# Patient Record
Sex: FEMALE | Race: OTHER | NOT HISPANIC OR LATINO | URBAN - METROPOLITAN AREA
[De-identification: names, ages, dates, MRNs, and addresses within clinical notes are randomized per-mention and may not be internally consistent; named-entity substitution may affect disease eponyms.]

---

## 2024-07-07 ENCOUNTER — INPATIENT (INPATIENT)
Facility: HOSPITAL | Age: 44
LOS: 0 days | Discharge: LEFT BEFORE DISCHARGE | End: 2024-07-08
Attending: INTERNAL MEDICINE | Admitting: INTERNAL MEDICINE
Payer: SELF-PAY

## 2024-07-07 VITALS
WEIGHT: 240.08 LBS | DIASTOLIC BLOOD PRESSURE: 66 MMHG | SYSTOLIC BLOOD PRESSURE: 103 MMHG | RESPIRATION RATE: 16 BRPM | OXYGEN SATURATION: 98 % | HEART RATE: 98 BPM | TEMPERATURE: 99 F | HEIGHT: 63 IN

## 2024-07-07 DIAGNOSIS — Z98.890 OTHER SPECIFIED POSTPROCEDURAL STATES: Chronic | ICD-10-CM

## 2024-07-07 LAB
A1C WITH ESTIMATED AVERAGE GLUCOSE RESULT: 10.2 % — HIGH (ref 4–5.6)
ADD ON TEST-SPECIMEN IN LAB: SIGNIFICANT CHANGE UP
ALBUMIN SERPL ELPH-MCNC: 2.5 G/DL — LOW (ref 3.4–5)
ALBUMIN SERPL ELPH-MCNC: 3 G/DL — LOW (ref 3.3–5)
ALP SERPL-CCNC: 56 U/L — SIGNIFICANT CHANGE UP (ref 40–120)
ALP SERPL-CCNC: 70 U/L — SIGNIFICANT CHANGE UP (ref 40–120)
ALT FLD-CCNC: 20 U/L — SIGNIFICANT CHANGE UP (ref 12–42)
ALT FLD-CCNC: 74 U/L — HIGH (ref 10–45)
ANION GAP SERPL CALC-SCNC: 11 MMOL/L — SIGNIFICANT CHANGE UP (ref 9–16)
ANION GAP SERPL CALC-SCNC: 8 MMOL/L — SIGNIFICANT CHANGE UP (ref 5–17)
APPEARANCE UR: ABNORMAL
APTT BLD: 26.6 SEC — SIGNIFICANT CHANGE UP (ref 24.5–35.6)
AST SERPL-CCNC: 118 U/L — HIGH (ref 10–40)
AST SERPL-CCNC: 23 U/L — SIGNIFICANT CHANGE UP (ref 15–37)
BASE EXCESS BLDA CALC-SCNC: -4.5 MMOL/L — LOW (ref -2–3)
BASOPHILS # BLD AUTO: 0.01 K/UL — SIGNIFICANT CHANGE UP (ref 0–0.2)
BASOPHILS # BLD AUTO: 0.02 K/UL — SIGNIFICANT CHANGE UP (ref 0–0.2)
BASOPHILS NFR BLD AUTO: 0.1 % — SIGNIFICANT CHANGE UP (ref 0–2)
BASOPHILS NFR BLD AUTO: 0.1 % — SIGNIFICANT CHANGE UP (ref 0–2)
BILIRUB SERPL-MCNC: 0.5 MG/DL — SIGNIFICANT CHANGE UP (ref 0.2–1.2)
BILIRUB SERPL-MCNC: 0.5 MG/DL — SIGNIFICANT CHANGE UP (ref 0.2–1.2)
BILIRUB UR-MCNC: NEGATIVE — SIGNIFICANT CHANGE UP
BUN SERPL-MCNC: 10 MG/DL — SIGNIFICANT CHANGE UP (ref 7–23)
BUN SERPL-MCNC: 12 MG/DL — SIGNIFICANT CHANGE UP (ref 7–23)
CALCIUM SERPL-MCNC: 7.8 MG/DL — LOW (ref 8.4–10.5)
CALCIUM SERPL-MCNC: 8.5 MG/DL — SIGNIFICANT CHANGE UP (ref 8.5–10.5)
CHLORIDE SERPL-SCNC: 103 MMOL/L — SIGNIFICANT CHANGE UP (ref 96–108)
CHLORIDE SERPL-SCNC: 98 MMOL/L — SIGNIFICANT CHANGE UP (ref 96–108)
CK SERPL-CCNC: 637 U/L — HIGH (ref 25–170)
CO2 BLDA-SCNC: 21 MMOL/L — SIGNIFICANT CHANGE UP (ref 19–24)
CO2 SERPL-SCNC: 22 MMOL/L — SIGNIFICANT CHANGE UP (ref 22–31)
CO2 SERPL-SCNC: 24 MMOL/L — SIGNIFICANT CHANGE UP (ref 22–31)
COLOR SPEC: YELLOW — SIGNIFICANT CHANGE UP
CREAT SERPL-MCNC: 1.23 MG/DL — SIGNIFICANT CHANGE UP (ref 0.5–1.3)
CREAT SERPL-MCNC: 1.32 MG/DL — HIGH (ref 0.5–1.3)
CRP SERPL-MCNC: 106.1 MG/L — HIGH (ref 0.5–3)
DIFF PNL FLD: NEGATIVE — SIGNIFICANT CHANGE UP
EGFR: 51 ML/MIN/1.73M2 — LOW
EGFR: 56 ML/MIN/1.73M2 — LOW
EOSINOPHIL # BLD AUTO: 0 K/UL — SIGNIFICANT CHANGE UP (ref 0–0.5)
EOSINOPHIL # BLD AUTO: 0 K/UL — SIGNIFICANT CHANGE UP (ref 0–0.5)
EOSINOPHIL NFR BLD AUTO: 0 % — SIGNIFICANT CHANGE UP (ref 0–6)
EOSINOPHIL NFR BLD AUTO: 0 % — SIGNIFICANT CHANGE UP (ref 0–6)
ESTIMATED AVERAGE GLUCOSE: 246 MG/DL — HIGH (ref 68–114)
FLUAV AG NPH QL: SIGNIFICANT CHANGE UP
FLUBV AG NPH QL: SIGNIFICANT CHANGE UP
GLUCOSE BLDC GLUCOMTR-MCNC: 158 MG/DL — HIGH (ref 70–99)
GLUCOSE BLDC GLUCOMTR-MCNC: 207 MG/DL — HIGH (ref 70–99)
GLUCOSE BLDC GLUCOMTR-MCNC: 217 MG/DL — HIGH (ref 70–99)
GLUCOSE SERPL-MCNC: 235 MG/DL — HIGH (ref 70–99)
GLUCOSE SERPL-MCNC: 297 MG/DL — HIGH (ref 70–99)
GLUCOSE UR QL: NEGATIVE MG/DL — SIGNIFICANT CHANGE UP
HCO3 BLDA-SCNC: 20 MMOL/L — LOW (ref 21–28)
HCT VFR BLD CALC: 27.5 % — LOW (ref 34.5–45)
HCT VFR BLD CALC: 28.1 % — LOW (ref 34.5–45)
HGB BLD-MCNC: 8.7 G/DL — LOW (ref 11.5–15.5)
HGB BLD-MCNC: 9 G/DL — LOW (ref 11.5–15.5)
HOROWITZ INDEX BLDA+IHG-RTO: 21 — SIGNIFICANT CHANGE UP
IMM GRANULOCYTES NFR BLD AUTO: 0.5 % — SIGNIFICANT CHANGE UP (ref 0–0.9)
IMM GRANULOCYTES NFR BLD AUTO: 0.5 % — SIGNIFICANT CHANGE UP (ref 0–0.9)
INR BLD: 1.23 — HIGH (ref 0.85–1.18)
KETONES UR-MCNC: NEGATIVE MG/DL — SIGNIFICANT CHANGE UP
LACTATE BLDV-MCNC: 1.1 MMOL/L — SIGNIFICANT CHANGE UP (ref 0.5–2)
LACTATE SERPL-SCNC: 1.2 MMOL/L — SIGNIFICANT CHANGE UP (ref 0.5–2)
LEUKOCYTE ESTERASE UR-ACNC: NEGATIVE — SIGNIFICANT CHANGE UP
LYMPHOCYTES # BLD AUTO: 1.79 K/UL — SIGNIFICANT CHANGE UP (ref 1–3.3)
LYMPHOCYTES # BLD AUTO: 13.5 % — SIGNIFICANT CHANGE UP (ref 13–44)
LYMPHOCYTES # BLD AUTO: 15.4 % — SIGNIFICANT CHANGE UP (ref 13–44)
LYMPHOCYTES # BLD AUTO: 2.05 K/UL — SIGNIFICANT CHANGE UP (ref 1–3.3)
MAGNESIUM SERPL-MCNC: 1.7 MG/DL — SIGNIFICANT CHANGE UP (ref 1.6–2.6)
MCHC RBC-ENTMCNC: 25.1 PG — LOW (ref 27–34)
MCHC RBC-ENTMCNC: 25.4 PG — LOW (ref 27–34)
MCHC RBC-ENTMCNC: 31.6 GM/DL — LOW (ref 32–36)
MCHC RBC-ENTMCNC: 32 GM/DL — SIGNIFICANT CHANGE UP (ref 32–36)
MCV RBC AUTO: 79.3 FL — LOW (ref 80–100)
MCV RBC AUTO: 79.4 FL — LOW (ref 80–100)
MONOCYTES # BLD AUTO: 0.9 K/UL — SIGNIFICANT CHANGE UP (ref 0–0.9)
MONOCYTES # BLD AUTO: 1.26 K/UL — HIGH (ref 0–0.9)
MONOCYTES NFR BLD AUTO: 7.7 % — SIGNIFICANT CHANGE UP (ref 2–14)
MONOCYTES NFR BLD AUTO: 8.3 % — SIGNIFICANT CHANGE UP (ref 2–14)
NEUTROPHILS # BLD AUTO: 11.79 K/UL — HIGH (ref 1.8–7.4)
NEUTROPHILS # BLD AUTO: 8.87 K/UL — HIGH (ref 1.8–7.4)
NEUTROPHILS NFR BLD AUTO: 76.3 % — SIGNIFICANT CHANGE UP (ref 43–77)
NEUTROPHILS NFR BLD AUTO: 77.6 % — HIGH (ref 43–77)
NITRITE UR-MCNC: NEGATIVE — SIGNIFICANT CHANGE UP
NRBC # BLD: 0 /100 WBCS — SIGNIFICANT CHANGE UP (ref 0–0)
NRBC # BLD: 0 /100 WBCS — SIGNIFICANT CHANGE UP (ref 0–0)
PCO2 BLDA: 36 MMHG — SIGNIFICANT CHANGE UP (ref 32–45)
PCO2 BLDV: 40 MMHG — SIGNIFICANT CHANGE UP (ref 39–42)
PCP SPEC-MCNC: SIGNIFICANT CHANGE UP
PH BLDA: 7.36 — SIGNIFICANT CHANGE UP (ref 7.35–7.45)
PH BLDV: 7.42 — SIGNIFICANT CHANGE UP (ref 7.32–7.43)
PH UR: 8 — SIGNIFICANT CHANGE UP (ref 5–8)
PHOSPHATE SERPL-MCNC: 3.2 MG/DL — SIGNIFICANT CHANGE UP (ref 2.5–4.5)
PLATELET # BLD AUTO: 233 K/UL — SIGNIFICANT CHANGE UP (ref 150–400)
PLATELET # BLD AUTO: 255 K/UL — SIGNIFICANT CHANGE UP (ref 150–400)
PO2 BLDA: 91 MMHG — SIGNIFICANT CHANGE UP (ref 83–108)
PO2 BLDV: <35 MMHG — SIGNIFICANT CHANGE UP (ref 25–45)
POTASSIUM SERPL-MCNC: 3.3 MMOL/L — LOW (ref 3.5–5.3)
POTASSIUM SERPL-MCNC: 3.7 MMOL/L — SIGNIFICANT CHANGE UP (ref 3.5–5.3)
POTASSIUM SERPL-SCNC: 3.3 MMOL/L — LOW (ref 3.5–5.3)
POTASSIUM SERPL-SCNC: 3.7 MMOL/L — SIGNIFICANT CHANGE UP (ref 3.5–5.3)
PROT SERPL-MCNC: 7 G/DL — SIGNIFICANT CHANGE UP (ref 6–8.3)
PROT SERPL-MCNC: 7.7 G/DL — SIGNIFICANT CHANGE UP (ref 6.4–8.2)
PROT UR-MCNC: NEGATIVE MG/DL — SIGNIFICANT CHANGE UP
PROTHROM AB SERPL-ACNC: 13.9 SEC — HIGH (ref 9.5–13)
RBC # BLD: 3.47 M/UL — LOW (ref 3.8–5.2)
RBC # BLD: 3.54 M/UL — LOW (ref 3.8–5.2)
RBC # FLD: 16.8 % — HIGH (ref 10.3–14.5)
RBC # FLD: 17.1 % — HIGH (ref 10.3–14.5)
RSV RNA NPH QL NAA+NON-PROBE: SIGNIFICANT CHANGE UP
SAO2 % BLDA: 98.6 % — HIGH (ref 94–98)
SAO2 % BLDV: 52.3 % — LOW (ref 67–88)
SARS-COV-2 RNA SPEC QL NAA+PROBE: SIGNIFICANT CHANGE UP
SODIUM SERPL-SCNC: 133 MMOL/L — LOW (ref 135–145)
SODIUM SERPL-SCNC: 133 MMOL/L — SIGNIFICANT CHANGE UP (ref 132–145)
SP GR SPEC: 1.01 — SIGNIFICANT CHANGE UP (ref 1–1.03)
UROBILINOGEN FLD QL: 1 MG/DL — SIGNIFICANT CHANGE UP (ref 0.2–1)
WBC # BLD: 11.63 K/UL — HIGH (ref 3.8–10.5)
WBC # BLD: 15.2 K/UL — HIGH (ref 3.8–10.5)
WBC # FLD AUTO: 11.63 K/UL — HIGH (ref 3.8–10.5)
WBC # FLD AUTO: 15.2 K/UL — HIGH (ref 3.8–10.5)

## 2024-07-07 PROCEDURE — 36000 PLACE NEEDLE IN VEIN: CPT

## 2024-07-07 PROCEDURE — 70450 CT HEAD/BRAIN W/O DYE: CPT | Mod: 26

## 2024-07-07 PROCEDURE — 73702 CT LWR EXTREMITY W/O&W/DYE: CPT | Mod: 26,50

## 2024-07-07 PROCEDURE — 71045 X-RAY EXAM CHEST 1 VIEW: CPT | Mod: 26

## 2024-07-07 PROCEDURE — 73590 X-RAY EXAM OF LOWER LEG: CPT | Mod: 26,50

## 2024-07-07 PROCEDURE — 76937 US GUIDE VASCULAR ACCESS: CPT | Mod: 26

## 2024-07-07 PROCEDURE — 99285 EMERGENCY DEPT VISIT HI MDM: CPT | Mod: 25

## 2024-07-07 PROCEDURE — 99291 CRITICAL CARE FIRST HOUR: CPT | Mod: GC

## 2024-07-07 PROCEDURE — 36556 INSERT NON-TUNNEL CV CATH: CPT | Mod: LT

## 2024-07-07 PROCEDURE — 99053 MED SERV 10PM-8AM 24 HR FAC: CPT

## 2024-07-07 RX ORDER — CEFEPIME 1 G/1
2000 INJECTION, POWDER, FOR SOLUTION INTRAMUSCULAR; INTRAVENOUS EVERY 8 HOURS
Refills: 0 | Status: DISCONTINUED | OUTPATIENT
Start: 2024-07-07 | End: 2024-07-07

## 2024-07-07 RX ORDER — CLINDAMYCIN PHOSPHATE 150 MG/ML
600 INJECTION, SOLUTION INTRAVENOUS EVERY 8 HOURS
Refills: 0 | Status: DISCONTINUED | OUTPATIENT
Start: 2024-07-07 | End: 2024-07-07

## 2024-07-07 RX ORDER — DEXTROSE 30 % IN WATER 30 %
25 VIAL (ML) INTRAVENOUS ONCE
Refills: 0 | Status: DISCONTINUED | OUTPATIENT
Start: 2024-07-07 | End: 2024-07-08

## 2024-07-07 RX ORDER — VANCOMYCIN HYDROCHLORIDE 50 MG/ML
1500 KIT ORAL ONCE
Refills: 0 | Status: COMPLETED | OUTPATIENT
Start: 2024-07-07 | End: 2024-07-07

## 2024-07-07 RX ORDER — PIPERACILLIN SODIUM AND TAZOBACTAM SODIUM 3; .375 G/15ML; G/15ML
4.5 INJECTION, POWDER, LYOPHILIZED, FOR SOLUTION INTRAVENOUS EVERY 8 HOURS
Refills: 0 | Status: DISCONTINUED | OUTPATIENT
Start: 2024-07-07 | End: 2024-07-08

## 2024-07-07 RX ORDER — VANCOMYCIN HYDROCHLORIDE 50 MG/ML
1750 KIT ORAL EVERY 12 HOURS
Refills: 0 | Status: DISCONTINUED | OUTPATIENT
Start: 2024-07-07 | End: 2024-07-07

## 2024-07-07 RX ORDER — DEXTROSE MONOHYDRATE 100 MG/ML
125 INJECTION, SOLUTION INTRAVENOUS ONCE
Refills: 0 | Status: DISCONTINUED | OUTPATIENT
Start: 2024-07-07 | End: 2024-07-08

## 2024-07-07 RX ORDER — PIPERACILLIN SODIUM AND TAZOBACTAM SODIUM 3; .375 G/15ML; G/15ML
3.38 INJECTION, POWDER, LYOPHILIZED, FOR SOLUTION INTRAVENOUS ONCE
Refills: 0 | Status: COMPLETED | OUTPATIENT
Start: 2024-07-07 | End: 2024-07-07

## 2024-07-07 RX ORDER — SODIUM CHLORIDE 0.9 % (FLUSH) 0.9 %
1000 SYRINGE (ML) INJECTION ONCE
Refills: 0 | Status: COMPLETED | OUTPATIENT
Start: 2024-07-07 | End: 2024-07-07

## 2024-07-07 RX ORDER — NALOXONE HYDROCHLORIDE 1 MG/ML
0.2 INJECTION PARENTERAL
Qty: 2 | Refills: 0 | Status: DISCONTINUED | OUTPATIENT
Start: 2024-07-07 | End: 2024-07-07

## 2024-07-07 RX ORDER — POTASSIUM CHLORIDE 600 MG/1
40 TABLET, FILM COATED, EXTENDED RELEASE ORAL ONCE
Refills: 0 | Status: COMPLETED | OUTPATIENT
Start: 2024-07-07 | End: 2024-07-07

## 2024-07-07 RX ORDER — NALOXONE HYDROCHLORIDE 1 MG/ML
0.2 INJECTION PARENTERAL ONCE
Refills: 0 | Status: COMPLETED | OUTPATIENT
Start: 2024-07-07 | End: 2024-07-07

## 2024-07-07 RX ORDER — ACETAMINOPHEN 325 MG
650 TABLET ORAL EVERY 6 HOURS
Refills: 0 | Status: DISCONTINUED | OUTPATIENT
Start: 2024-07-07 | End: 2024-07-08

## 2024-07-07 RX ORDER — INSULIN LISPRO 100 [IU]/ML
INJECTION, SOLUTION SUBCUTANEOUS
Refills: 0 | Status: DISCONTINUED | OUTPATIENT
Start: 2024-07-07 | End: 2024-07-08

## 2024-07-07 RX ORDER — GLUCAGON HYDROCHLORIDE 1 MG/ML
1 INJECTION, POWDER, FOR SOLUTION INTRAMUSCULAR; INTRAVENOUS; SUBCUTANEOUS ONCE
Refills: 0 | Status: DISCONTINUED | OUTPATIENT
Start: 2024-07-07 | End: 2024-07-08

## 2024-07-07 RX ORDER — DEXTROSE MONOHYDRATE AND SODIUM CHLORIDE 5; .3 G/100ML; G/100ML
1000 INJECTION, SOLUTION INTRAVENOUS
Refills: 0 | Status: DISCONTINUED | OUTPATIENT
Start: 2024-07-07 | End: 2024-07-08

## 2024-07-07 RX ORDER — KETOROLAC TROMETHAMINE 30 MG/ML
15 INJECTION, SOLUTION INTRAMUSCULAR ONCE
Refills: 0 | Status: DISCONTINUED | OUTPATIENT
Start: 2024-07-07 | End: 2024-07-07

## 2024-07-07 RX ORDER — DEXTROSE 30 % IN WATER 30 %
12.5 VIAL (ML) INTRAVENOUS ONCE
Refills: 0 | Status: DISCONTINUED | OUTPATIENT
Start: 2024-07-07 | End: 2024-07-08

## 2024-07-07 RX ORDER — NOREPINEPHRINE BITARTRATE 1 MG/ML
0.02 INJECTION INTRAVENOUS
Qty: 8 | Refills: 0 | Status: DISCONTINUED | OUTPATIENT
Start: 2024-07-07 | End: 2024-07-08

## 2024-07-07 RX ORDER — PIPERACILLIN SODIUM AND TAZOBACTAM SODIUM 3; .375 G/15ML; G/15ML
4.5 INJECTION, POWDER, LYOPHILIZED, FOR SOLUTION INTRAVENOUS ONCE
Refills: 0 | Status: COMPLETED | OUTPATIENT
Start: 2024-07-07 | End: 2024-07-07

## 2024-07-07 RX ORDER — HEPARIN SODIUM 50 [USP'U]/ML
7500 INJECTION, SOLUTION INTRAVENOUS EVERY 8 HOURS
Refills: 0 | Status: DISCONTINUED | OUTPATIENT
Start: 2024-07-07 | End: 2024-07-08

## 2024-07-07 RX ORDER — ACETAMINOPHEN 325 MG
975 TABLET ORAL ONCE
Refills: 0 | Status: COMPLETED | OUTPATIENT
Start: 2024-07-07 | End: 2024-07-07

## 2024-07-07 RX ORDER — DEXTROSE 30 % IN WATER 30 %
15 VIAL (ML) INTRAVENOUS ONCE
Refills: 0 | Status: DISCONTINUED | OUTPATIENT
Start: 2024-07-07 | End: 2024-07-08

## 2024-07-07 RX ORDER — NOREPINEPHRINE BITARTRATE 1 MG/ML
0.1 INJECTION INTRAVENOUS
Qty: 8 | Refills: 0 | Status: DISCONTINUED | OUTPATIENT
Start: 2024-07-07 | End: 2024-07-07

## 2024-07-07 RX ORDER — CLINDAMYCIN PHOSPHATE 150 MG/ML
900 INJECTION, SOLUTION INTRAVENOUS EVERY 8 HOURS
Refills: 0 | Status: DISCONTINUED | OUTPATIENT
Start: 2024-07-07 | End: 2024-07-08

## 2024-07-07 RX ORDER — VANCOMYCIN HYDROCHLORIDE 50 MG/ML
1750 KIT ORAL EVERY 12 HOURS
Refills: 0 | Status: DISCONTINUED | OUTPATIENT
Start: 2024-07-07 | End: 2024-07-08

## 2024-07-07 RX ORDER — NALOXONE HYDROCHLORIDE 1 MG/ML
0.4 INJECTION PARENTERAL ONCE
Refills: 0 | Status: COMPLETED | OUTPATIENT
Start: 2024-07-07 | End: 2024-07-07

## 2024-07-07 RX ORDER — HALOPERIDOL DECANOATE 100 MG/ML
2.5 VIAL (ML) INTRAMUSCULAR ONCE
Refills: 0 | Status: COMPLETED | OUTPATIENT
Start: 2024-07-07 | End: 2024-07-07

## 2024-07-07 RX ADMIN — PIPERACILLIN SODIUM AND TAZOBACTAM SODIUM 25 GRAM(S): 3; .375 INJECTION, POWDER, LYOPHILIZED, FOR SOLUTION INTRAVENOUS at 22:51

## 2024-07-07 RX ADMIN — KETOROLAC TROMETHAMINE 15 MILLIGRAM(S): 30 INJECTION, SOLUTION INTRAMUSCULAR at 01:39

## 2024-07-07 RX ADMIN — VANCOMYCIN HYDROCHLORIDE 250 MILLIGRAM(S): KIT at 21:38

## 2024-07-07 RX ADMIN — Medication 975 MILLIGRAM(S): at 01:39

## 2024-07-07 RX ADMIN — Medication 1000 MILLILITER(S): at 04:18

## 2024-07-07 RX ADMIN — Medication 2.5 MILLIGRAM(S): at 12:10

## 2024-07-07 RX ADMIN — Medication 1000 MILLILITER(S): at 01:39

## 2024-07-07 RX ADMIN — Medication 1000 MILLILITER(S): at 04:49

## 2024-07-07 RX ADMIN — Medication 975 MILLIGRAM(S): at 04:10

## 2024-07-07 RX ADMIN — POTASSIUM CHLORIDE 40 MILLIEQUIVALENT(S): 600 TABLET, FILM COATED, EXTENDED RELEASE ORAL at 02:52

## 2024-07-07 RX ADMIN — NOREPINEPHRINE BITARTRATE 20.4 MICROGRAM(S)/KG/MIN: 1 INJECTION INTRAVENOUS at 09:00

## 2024-07-07 RX ADMIN — NOREPINEPHRINE BITARTRATE 20.4 MICROGRAM(S)/KG/MIN: 1 INJECTION INTRAVENOUS at 04:43

## 2024-07-07 RX ADMIN — KETOROLAC TROMETHAMINE 15 MILLIGRAM(S): 30 INJECTION, SOLUTION INTRAMUSCULAR at 04:10

## 2024-07-07 RX ADMIN — NALOXONE HYDROCHLORIDE 0.4 MILLIGRAM(S): 1 INJECTION PARENTERAL at 08:59

## 2024-07-07 RX ADMIN — CLINDAMYCIN PHOSPHATE 112 MILLIGRAM(S): 150 INJECTION, SOLUTION INTRAVENOUS at 21:20

## 2024-07-07 RX ADMIN — Medication 650 MILLIGRAM(S): at 23:15

## 2024-07-07 RX ADMIN — INSULIN LISPRO 4: 100 INJECTION, SOLUTION SUBCUTANEOUS at 12:52

## 2024-07-07 RX ADMIN — Medication 1000 MILLILITER(S): at 04:17

## 2024-07-07 RX ADMIN — Medication 1000 MILLILITER(S): at 04:10

## 2024-07-07 RX ADMIN — CLINDAMYCIN PHOSPHATE 112 MILLIGRAM(S): 150 INJECTION, SOLUTION INTRAVENOUS at 13:00

## 2024-07-07 RX ADMIN — PIPERACILLIN SODIUM AND TAZOBACTAM SODIUM 25 GRAM(S): 3; .375 INJECTION, POWDER, LYOPHILIZED, FOR SOLUTION INTRAVENOUS at 14:51

## 2024-07-07 RX ADMIN — VANCOMYCIN HYDROCHLORIDE 1500 MILLIGRAM(S): KIT at 04:10

## 2024-07-07 RX ADMIN — NOREPINEPHRINE BITARTRATE 4.08 MICROGRAM(S)/KG/MIN: 1 INJECTION INTRAVENOUS at 19:41

## 2024-07-07 RX ADMIN — PIPERACILLIN SODIUM AND TAZOBACTAM SODIUM 200 GRAM(S): 3; .375 INJECTION, POWDER, LYOPHILIZED, FOR SOLUTION INTRAVENOUS at 01:39

## 2024-07-07 RX ADMIN — Medication 1000 MILLILITER(S): at 05:10

## 2024-07-07 RX ADMIN — NALOXONE HYDROCHLORIDE 50 MG/HR: 1 INJECTION PARENTERAL at 10:26

## 2024-07-07 RX ADMIN — PIPERACILLIN SODIUM AND TAZOBACTAM SODIUM 3.38 GRAM(S): 3; .375 INJECTION, POWDER, LYOPHILIZED, FOR SOLUTION INTRAVENOUS at 04:10

## 2024-07-07 RX ADMIN — INSULIN LISPRO 4: 100 INJECTION, SOLUTION SUBCUTANEOUS at 17:15

## 2024-07-07 RX ADMIN — PIPERACILLIN SODIUM AND TAZOBACTAM SODIUM 200 GRAM(S): 3; .375 INJECTION, POWDER, LYOPHILIZED, FOR SOLUTION INTRAVENOUS at 10:26

## 2024-07-07 RX ADMIN — NALOXONE HYDROCHLORIDE 0.2 MILLIGRAM(S): 1 INJECTION PARENTERAL at 08:24

## 2024-07-07 RX ADMIN — NALOXONE HYDROCHLORIDE 0.2 MILLIGRAM(S): 1 INJECTION PARENTERAL at 08:20

## 2024-07-07 RX ADMIN — HEPARIN SODIUM 7500 UNIT(S): 50 INJECTION, SOLUTION INTRAVENOUS at 12:52

## 2024-07-07 RX ADMIN — VANCOMYCIN HYDROCHLORIDE 250 MILLIGRAM(S): KIT at 02:52

## 2024-07-07 NOTE — CONSULT NOTE ADULT - ASSESSMENT
43F with PMHx of NIDDM, PE, TBI in the past with seizure d/o, anemia, HLD, not on any meds x few months, undomiciled, poor historian, BIBA for worsening b/l feet pain and increased tingling sensation x 1d. Pt reports having chronic BLE wounds x months    -Patient seen with Chief resident on call.  -Gently cleaned with peroxide and vashe solution.  -Dress with wet to dry gauze with vashe and wrap with kerlix.  Patient insisted on doing dressing change herself.  -No acute vascular intervention required.  -Vascular to continue to follow.  [phone] for any questions.

## 2024-07-07 NOTE — ED ADULT TRIAGE NOTE - CHIEF COMPLAINT QUOTE
Pt brought in by EMS for complaint of bilateral feet numbness and bilateral hand pain X 2 days stating she is a diabetic.

## 2024-07-07 NOTE — ED PROVIDER NOTE - PHYSICAL EXAMINATION
Gen - Disheveled F, BMI>40, NAD, speaking in full sentences   Skin - warm, dry, intact  HEENT - AT/NC, PERRL, mild conjunctival injection, pupils 3mm b/l, TM intact with no hemotympanium b/l, no facial contusion or periorbital ecchymosis, o/p clear, uvula midline, airway patent, neck supple with no step off or midline tenderness, FROM   CV - S1S2, R/R/R  Resp - respiration non-labored, CTAB, symmetric bs b/l, no r/r/w  GI - NABS, soft, protuberant, NT, no rebound or guarding, no CVAT b/l  MS - moving all extremities, no c/c/e, w/w/p, palpable symmetric pulses b/l, compartment soft, calves supple and NT   Neuro - AxOx3, no focal neuro deficits, ambulatory with steady gait Gen - Disheveled F, BMI>40, somnolent appearing but arousable to verbal stimuli, speaking in full sentences   Skin - warm, poor hygiene, soiled dressing to BLE, +6x5cm and 7x5cm irregularly demarcated ulcerated wounds to BLE on the medial aspects with chronic venous stasis skin changes, no crepitus, or active bleeding or bony/tendon exposure noted   HEENT - AT/NC, PERRL, mild conjunctival injection, pupils 3mm b/l, TM intact with no hemotympanium b/l, no facial contusion or periorbital ecchymosis, o/p clear, uvula midline, airway patent, neck supple with no step off or midline tenderness, FROM   CV - S1S2, R/R/R  Resp - respiration non-labored, CTAB, symmetric bs b/l, no r/r/w  GI - NABS, soft, protuberant, NT, no rebound or guarding, no CVAT b/l  MS - poor hygiene, chronic PVD skin changes with wounds per skin section, 1+ BLE pitting edema, calves supple and NT, palpable symmetric distal pulses b/l, compartment soft  Neuro - somnolent but arousable to verbal stimuli, no focal neuro deficits, unable to ambulate due to generalized weakness, able to weight bear on BLE

## 2024-07-07 NOTE — H&P ADULT - NSHPPHYSICALEXAM_GEN_ALL_CORE
VITAL SIGNS:  T(C): 36.8 (07-07-24 @ 14:08), Max: 39.6 (07-07-24 @ 01:52)  T(F): 98.2 (07-07-24 @ 14:08), Max: 103.2 (07-07-24 @ 01:52)  HR: 89 (07-07-24 @ 16:00) (52 - 98)  BP: 91/67 (07-07-24 @ 16:00) (67/37 - 149/87)  BP(mean): 74 (07-07-24 @ 16:00) (47 - 112)  RR: 20 (07-07-24 @ 16:00) (15 - 21)  SpO2: 98% (07-07-24 @ 16:00) (94% - 100%)  Wt(kg): --    PHYSICAL EXAM:  Constitutional: Patient is obtunded on arrival from [facility], respirating around 10 breaths per minute, intermittent snoring.   HEENT: Pinpoint pupils, anicteric sclera, mucous membranes moist.   Respiratory: Clear to auscultation bilaterally; no Wheezing/Crackles/Ronchi.  Cardiac: Regular rate and rhythm, S1/S2; no Murmur/Rub/Gallop;   Gastrointestinal: abdomen soft, non-tender and non-distended;   Extremities: Warm and Well perfused, no clubbing or cyanosis; bilateral lasrge ulcers to the anterior shin with overlying granulation tissue, mild purulent drainage.   Vascular: 2+ radial, Dorsalis pedis and posterior tibial pulses bilaterally.  Neurologic: Obtunded, awakes to voice after narcan with improved respirations.   Psychiatric: Unable to assess.    General - Disheveled F, BMI>40, somnolent appearing but arousable to verbal stimuli, speaking in full sentences   HEENT - AT/NC, PERRLA, mild conjunctival injection, pinpoint pupils  uvula midline, airway patent, neck supple  CV - S1S2, R/R/R  Pulmonary- , CTAB, symmetric bs b/l, no r/r/w  GI - soft, non tender, non distended, active bowel sounds   Extremities - 1+ BLE pitting edema, calves supple and NT, palpable symmetric distal pulses b/l  Skin - warm, poor hygiene, soiled dressing to BLE, +6x5cm and 7x5cm irregularly demarcated ulcerated wounds to BLE on the medial aspects with chronic venous stasis skin changes, no active bleeding   Neuro - somnolent but arousable to verbal stimuli, no focal neuro deficits,

## 2024-07-07 NOTE — H&P ADULT - ASSESSMENT
ASSESSMENT  43F with PMHx of NIDDM, PE, TBI in the past with seizure d/o, SSC with anemia, HLD, not on any meds x few months, undomiciled, poor historian, presented to [facility] with severe b/l LE open wounds requring amdission for cellulitis, and then transferred to [facility] MICU for septic shock from cellulitis requiring pressor support and currently being treated with Zosyn.     PLAN    Levo running on right AC.   BCx f/u  Replaced NG tube.      ASSESSMENT  43F with PMHx of NIDDM, PE, TBI in the past with seizure d/o, SSC with anemia, HLD, not on any meds x few months, undomiciled, poor historian, presented to [facility] with severe b/l LE open wounds requiring admission for cellulitis, and then transferred to [facility] MICU for septic shock from cellulitis requiring pressor support and currently being treated with Zosyn.     PLAN      NEURO   #Metabolic Encephalopathy  AMS 2/2 septic shock iso cellulitis. Pending further testing to assess severity.   Mechanical fall before arriving to hospital, with possible LoC & trauma to head.   -Zosyn for broad coverage, pending further testing results  -F/u BCx, consider CT LE to r/o necrotizing fascitis  -Obtain [facility]      CARDS  #Hypotension  Secondary to septic shock iso b/l LE cellulitis. MAPs >60 since on Levo.   -Levo ggt, titrate to stable MAPs  -Dopplers b/l LE    #HLD  -Obtain med rec for home meds      PULM  -ANEUDY    GI   #Deconditioned  Secondary to malnutritoin & poor PO intake  NG tube placed.   -Obtain nutrition consult, given possible refeeding concern as well   -Obtain confirmatory CXR for NGT    RENAL     ID  #Septic Shock #Cellulitis  #Metabolic Encephalopathy  AMS 2/2 septic shock iso cellulitis from b/l LE open wounds as source.   Started broad coverage with Zosyn. Pending further testing to assess severity, coverage.    -Zosyn & Vancomycin for cellulitis coverage, pending further testing results  -Get ID consult, obtain collateral, med rec  -F/u BCx, consider CT LE to r/o necrotizing fascitis  -Pain & fever control    ENDO  #NIDDM  Unable to provide history. No records on HIE checks.   -Obtain A1c  -FS  -Obtain collateral regarding disease course, home medications, med rec.     HEMEONC  #Hx of SCC  -Obtain collateral, med rec for course     RHEUM  #Fall  -Place fall precautions    #Ulcers #Open Wounds  -Pressure balancing mattress  -Serial wound checks      Px  F: Hydrate per fluid status needs  E: Replete as needed  N: Diet per nutrition assessment    FULL CODE       ASSESSMENT  43F with PMHx of NIDDM, PE, TBI in the past with seizure d/o, SSC with anemia, HLD, not on any meds x few months, undomiciled, poor historian, presented to [facility] with severe b/l LE open wounds requiring admission for cellulitis, and then transferred to [facility] MICU for septic shock from cellulitis requiring pressor support and currently being treated with Vanc,  Zosyn and Clindamycin.    PLAN    NEURO   #Metabolic Encephalopathy  AMS 2/2 septic shock iso cellulitis. Pending further testing to assess severity.   Mechanical fall before arriving to hospital, with possible LoC & trauma to head.   -Vanc, Zosyn and Clindamycin for broad coverage, pending further testing results  -F/u BCx,   - CT head: No acute intracranial findings  - CT LE: cutaneous wounds along the medial aspects of the lower legs bilaterally and diffuse BL subcutaneous soft tissue infiltration, left greater than R, consistent with edema and/or cellulitis      CARDS  #Hypotension  Secondary to septic shock iso b/l LE cellulitis. MAPs >60 since on Levo.   -Levo ggt, titrate to stable MAPs  -Dopplers b/l LE    #HLD  -Obtain med rec for home meds      PULM  -ANEUDY    GI   #Deconditioned  Secondary to malnutrition & poor PO intake  NG tube placed.   -Obtain nutrition consult, given possible refeeding concern as well   -Obtain confirmatory CXR for NGT    RENAL   - stable  - ANEUDY    ID  #Septic Shock #Cellulitis  #Metabolic Encephalopathy  AMS 2/2 septic shock iso cellulitis from b/l LE open wounds as source.   Started broad coverage with Zosyn. Pending further testing to assess severity, coverage.    -Zosyn & Vancomycin for cellulitis coverage, pending further testing results  -Get ID consult, obtain collateral, med rec  -F/u BCx, consider CT LE to r/o necrotizing fascitis  -Pain & fever control    #Ulcers #Open Wounds  - BL shin with eschars with hypertrophic skin changes  -Pressure balancing mattress  -Serial wound checks  - Consult vascular       ENDO  #NIDDM  Unable to provide history. No records on HIE checks.   - Glucose 297 this morning (07/07)  - A1c 10.2  - Nonurgent endocrine consult for DM management  -FS  -Obtain collateral regarding disease course, home medications, med rec.     HEMEONC  #Hx of SCC  -Obtain collateral, med rec for course     RHEUM  #Fall  -Place fall precautions      Px  F: Hydrate per fluid status needs  E: Replete as needed  N: Diet per nutrition assessment    FULL CODE       ASSESSMENT  43F with PMHx of NIDDM, PE, TBI in the past with seizure d/o, SSC with anemia, HLD, not on any meds x few months, undomiciled, poor historian, presented to [facility] with severe b/l LE open wounds requiring admission for cellulitis, and then transferred to [facility] MICU for septic shock from cellulitis requiring pressor support and currently being treated with Vanc,  Zosyn and Clindamycin.    PLAN    NEURO   #Metabolic Encephalopathy  AMS 2/2 septic shock iso cellulitis. Pending further testing to assess severity.   Mechanical fall before arriving to hospital, with possible LoC & trauma to head.   -Vanc, Zosyn and Clindamycin for broad coverage, pending further testing results  -F/u BCx,   - CT head: No acute intracranial findings  - CT LE: cutaneous wounds along the medial aspects of the lower legs bilaterally and diffuse BL subcutaneous soft tissue infiltration, left greater than R, consistent with edema and/or cellulitis      CARDS  #Hypotension  Secondary to septic shock iso b/l LE cellulitis. MAPs >60 since on Levo.   -Levo ggt, titrate to stable MAPs  -Dopplers b/l LE    #HLD  -Obtain med rec for home meds      PULM  -ANEUDY    GI   #Deconditioned  Secondary to malnutrition & poor PO intake  NG tube placed.   -Obtain nutrition consult, given possible refeeding concern as well   -Obtain confirmatory CXR for NGT      ID  #Septic Shock #Cellulitis  #Metabolic Encephalopathy  AMS 2/2 septic shock iso cellulitis from b/l LE open wounds as source.   Started broad coverage with Zosyn. Pending further testing to assess severity, coverage.    -Zosyn & Vancomycin for cellulitis coverage, pending further testing results  -Get ID consult, obtain collateral, med rec  -F/u BCx, consider CT LE to r/o necrotizing fascitis  -Pain & fever control    #Ulcers #Open Wounds  - BL shin with eschars with hypertrophic skin changes  -Pressure balancing mattress  -Serial wound checks  - Consult vascular     ENDO  #NIDDM  Unable to provide history. No records on HIE checks.   - Glucose 297 this morning (07/07)  - A1c 10.2  - Nonurgent endocrine consult for DM management  -FS  -Obtain collateral regarding disease course, home medications, med rec.     HEMEONC  #Hx of SCC  -Obtain collateral, med rec for course     RHEUM  #Fall  -Place fall precautions    RENAL   - stable  - ANEUDY      Px  F: Hydrate per fluid status needs  E: Replete as needed  N: Diet per nutrition assessment  DVT ppx: Heparin    FULL CODE       ASSESSMENT  43F with PMHx of NIDDM, PE, TBI in the past with seizure d/o, SSC with anemia, HLD, not on any meds x few months, undomiciled, poor historian, presented to [facility] with severe b/l LE open wounds requiring admission for cellulitis, and then transferred to [facility] MICU for septic shock from cellulitis requiring pressor support and currently being treated with Vanc,  Zosyn and Clindamycin.    PLAN    NEURO   #Metabolic Encephalopathy  AMS 2/2 septic shock iso cellulitis. Pending further testing to assess severity.   Mechanical fall before arriving to hospital, with possible LoC & trauma to head.   -Vanc, Zosyn and Clindamycin for broad coverage, pending further testing results  -F/u BCx,   - CT head: No acute intracranial findings      CARDS  #Hypotension: Patient found to have fevers, tachycardia. Extremities warm throughout with improvment after 4 L fluid repletion but notably reqiured norepinephrine to maintain map > 60.   Secondary to septic shock iso b/l LE cellulitis. MAPs >60 since on Levo.   -Levo ggt, titrate to stable MAPs  -Dopplers b/l LE    #HLD  -Obtain med rec for home meds      PULM  -ANEUDY    GI   #Deconditioned  Secondary to malnutrition & poor PO intake  NG tube placed.   -Obtain nutrition consult, given possible refeeding concern as well   -Obtain confirmatory CXR for NGT      ID  #Septic Shock #Cellulitis  #Metabolic Encephalopathy  AMS 2/2 septic shock iso cellulitis from b/l LE open wounds as source.   Started broad coverage with Zosyn. Pending further testing to assess severity, coverage.    -Zosyn, Vancomycin, clindamycin for cellulitis coverage, pending further testing results  -F/u BCx, consider CT LE to r/o necrotizing fascitis.   -Pain & fever control    #Ulcers #Open Wounds  - BL shin with eschars with hypertrophic skin changes  -Pressure balancing mattress  -Serial wound checks  - Consult vascular     ENDO  #NIDDM  Unable to provide history. No records on HIE checks.   - Glucose 297 this morning (07/07)  - A1c 10.2  - Nonurgent endocrine consult for DM management  -FS  -Obtain collateral regarding disease course, home medications, med rec.     HEMEONC  #Hx of SCC  -Obtain collateral, med rec for course     RHEUM  #Fall  -Place fall precautions    RENAL   - stable  - ANEUDY      Px  F: Hydrate per fluid status needs  E: Replete as needed  N: Diet per nutrition assessment  DVT ppx: Heparin    FULL CODE

## 2024-07-07 NOTE — ED PROVIDER NOTE - NSICDXPASTSURGICALHX_GEN_ALL_CORE_FT
PAST SURGICAL HISTORY:  No significant past surgical history      PAST SURGICAL HISTORY:  History of exploratory thoracotomy

## 2024-07-07 NOTE — H&P ADULT - ATTENDING COMMENTS
Homeless woman with DM2, TBI hx, LE wounds with metabolic and possibly toxic encephalopathy with septic shock from cellulitis  physical as above  empiric vanco/clinda/zosyn pending cultures  vascular consult  some improvement in mental status with naloxone despite negative toxicology screen; to start infusion  follow ETCO2  NE to keep MAP over 65  aggressive insulin coverage and may need infusion

## 2024-07-07 NOTE — ED PROVIDER NOTE - PROGRESS NOTE DETAILS
pt with difficult peripheral IV access.  Unsuccessful attempts by multiple RNs at bedside.  20 g PIV placed by myself to the L hand region. Pt with 2 PIV access pt is hemodynamically guarded s/p 3L of IVF with soft BP, 96/50s, MAP 65, mentation unchanged, somnolent but arousable to verbal stimuli, fever defervesced Status post Toradol and Tylenol.  Source most likely secondary to infected bilateral leg wounds.  Not suspecting  abscess or necrotizing fasciitis based on exam and x-rays.  Case discussed with medical intensivist for medicine stepdown admission due to hemodynamics after aggressive ED resuscitation and IV antibiotics.  Case cleared by Dr. Delaney for F admission. Hospitalist paged and awaiting callback on reassessment, pt's HD are labile, BP downtrended again to 77/50s > 82/45, oliguric, will start pt on peripheral levophed for septic shock. Bolivar inserted for strict I&O. Case re-discussed with Dr. Delaney, accepted to MICU for further management initial BP after 4th liter of IVF noted to be around 100/50s, MAP of 65mmHG, mentation unchanged. case discussed with Dr. Tang, accepted to [facility]. dean with 1L of clear UOP, BP improved to 102/60 > 120/80 after starting on peripheral levo. will continue to titrate and monitoring I&Os, awaiting transfer to [facility] MICU

## 2024-07-07 NOTE — H&P ADULT - NSHPLABSRESULTS_GEN_ALL_CORE
General - Disheveled F, BMI>40, somnolent appearing but arousable to verbal stimuli, speaking in full sentences   HEENT - AT/NC, PERRLA, mild conjunctival injection, pinpoint pupils  uvula midline, airway patent, neck supple  CV - S1S2, R/R/R  Pulmonary- , CTAB, symmetric bs b/l, no r/r/w  GI - soft, non tender, non distended, active bowel sounds   Extremities - 1+ BLE pitting edema, calves supple and NT, palpable symmetric distal pulses b/l  Skin - warm, poor hygiene, soiled dressing to BLE, +6x5cm and 7x5cm irregularly demarcated ulcerated wounds to BLE on the medial aspects with chronic venous stasis skin changes, no active bleeding   Neuro - somnolent but arousable to verbal stimuli, no focal neuro deficits, .  LABS:                         8.7    11.63 )-----------( 233      ( 07 Jul 2024 08:05 )             27.5     07-07    133<L>  |  103  |  12  ----------------------------<  297<H>  3.7   |  22  |  1.23    Ca    7.8<L>      07 Jul 2024 08:05  Phos  3.2     07-07  Mg     1.7     07-07    TPro  7.0  /  Alb  3.0<L>  /  TBili  0.5  /  DBili  x   /  AST  118<H>  /  ALT  74<H>  /  AlkPhos  70  07-07    PT/INR - ( 07 Jul 2024 08:05 )   PT: 13.9 sec;   INR: 1.23          PTT - ( 07 Jul 2024 08:05 )  PTT:26.6 sec  Urinalysis Basic - ( 07 Jul 2024 08:05 )    Color: x / Appearance: x / SG: x / pH: x  Gluc: 297 mg/dL / Ketone: x  / Bili: x / Urobili: x   Blood: x / Protein: x / Nitrite: x   Leuk Esterase: x / RBC: x / WBC x   Sq Epi: x / Non Sq Epi: x / Bacteria: x      CARDIAC MARKERS ( 07 Jul 2024 08:05 )  x     / x     / 637 U/L / x     / x            Lactate, Blood: 1.2 mmol/L (07-07 @ 08:05)      RADIOLOGY, EKG & ADDITIONAL TESTS: Reviewed.

## 2024-07-07 NOTE — ED ADULT NURSE NOTE - NSICDXPASTMEDICALHX_GEN_ALL_CORE_FT
PAST MEDICAL HISTORY:  DM (diabetes mellitus)     HLD (hyperlipidemia)     Open leg wound     Pulmonary emboli

## 2024-07-07 NOTE — H&P ADULT - NSICDXPASTMEDICALHX_GEN_ALL_CORE_FT
PAST MEDICAL HISTORY:  Anemia     DM (diabetes mellitus)     HLD (hyperlipidemia)     Open leg wound     Pulmonary emboli     Seizure disorder     Sickle cell disease     TBI (traumatic brain injury)

## 2024-07-07 NOTE — ED PROVIDER NOTE - CLINICAL SUMMARY MEDICAL DECISION MAKING FREE TEXT BOX
44 yo F with PMHx of NIDDM, PE, TBI in the past with seizure d/o, SSC with anemia, HLD, not on any meds x few months, undomiciled, poor historian, BIBA for worsening b/l feet pain and increased tingling sensation x 1d.   - noted foul smelling wound and warm to touch on arrival. Pt is fully disrobed 42 yo F with PMHx of NIDDM, PE, TBI in the past with seizure d/o, SSC with anemia, HLD, not on any meds x few months, undomiciled, poor historian, BIBA for worsening b/l feet pain and increased tingling sensation x 1d.   - noted foul smelling wound and warm to touch on arrival. Pt is fully disrobed with no concerning bugs or rash noted   p 42 yo F with PMHx of NIDDM, PE, TBI in the past with seizure d/o, SSC with anemia, HLD, not on any meds x few months, undomiciled, poor historian, BIBA for worsening b/l feet pain and increased tingling sensation x 1d.   - noted foul smelling wound and warm to touch on arrival. Pt is fully disrobed with no concerning bugs or rash noted   plan - sepsis code activation, pan culture, including blood, urine, viral swabs, and cxr, labs, ekg, cardiac monitoring, start IVF 30cc/kg/hr boluses, empiric abx for wound/pseudomonas coverage, xray of b/l tib/fib, antipyretics, and reassess  DDx - sepsis, infected wound, nec fas, osteo, UTI, PNA, viral syndrome

## 2024-07-07 NOTE — PROCEDURE NOTE - NSINDICATIONS_GEN_A_CORE
narcan/other
antibiotic therapy/emergency venous access/fluid administration
antibiotic therapy/emergency venous access/fluid administration

## 2024-07-07 NOTE — ED PROVIDER NOTE - ATTENDING SHARED VISIT SELECTOR YES
CM updated by [facility], they are able to accept Pt when medically ready. PS to Dr. Selam Candelaria to update. Plan discharge tomorrow pending creatinine. Discharging RN- Pt accepted to [facility]. Please arrange transportation with Sondra Dubose [phone]. Call [facility]ainey/admissions with transport time [phone].   Call report to [phone], fax discharge summary/AVS to [phone] Yes

## 2024-07-07 NOTE — ED ADULT NURSE NOTE - SEPSIS REFERENCE DATA FOR CRITERIA 1 WBC
Breathing spontaneous and unlabored. Breath sounds clear and equal bilaterally with regular rhythm.
Breathing spontaneous and unlabored. Breath sounds clear and equal bilaterally with regular rhythm.
Abnormal WBC: < 4,000 OR > 12,000

## 2024-07-07 NOTE — ED PROVIDER NOTE - OBJECTIVE STATEMENT
44 yo F with PMHx of NIDDM, not on any meds currently, undomiciled, BIBA for b/l feet pain and increased tingling sensation x 1d. Denies polyuria, polydipsia, polyphagia, fever, chills, N/V/D/C, abdominal pain, CP, SOB, palpitations, focal weakness, HA, dizziness, tremors, change in urinary/bowel functions, rash, AH/VH, and malaise. 44 yo F with PMHx of NIDDM, PE, TBI in the past with seizure d/o, not on any meds currently, undomiciled, BIBA for b/l feet pain and increased tingling sensation x 1d. Denies polyuria, polydipsia, polyphagia, fever, chills, N/V/D/C, abdominal pain, CP, SOB, palpitations, focal weakness, HA, dizziness, tremors, change in urinary/bowel functions, rash, AH/VH, and malaise. 42 yo F with PMHx of NIDDM, PE, TBI in the past with seizure d/o, SSC with anemia, HLD, not on any meds x few months, undomiciled, poor historian, BIBA for worsening b/l feet pain and increased tingling sensation x 1d. Pt reports having chronic BLE wounds x months, last dressing change was approximately 2 wks ago at OSH. Noted worsening pain, tingling, numbness, and difficulty ambulating for the past day. States that she has been feeling progressively weaker and just laid herself down at [facility] [address]. Denies trauma, fall, polyuria, polydipsia, polyphagia, fever, chills, N/V/D/C, abdominal pain, CP, SOB, palpitations, focal weakness, HA, dizziness, tremors, change in urinary/bowel functions, rash, AH/VH, and malaise.

## 2024-07-07 NOTE — ED PROVIDER NOTE - NSICDXPASTMEDICALHX_GEN_ALL_CORE_FT
PAST MEDICAL HISTORY:  DM (diabetes mellitus)      PAST MEDICAL HISTORY:  DM (diabetes mellitus)     HLD (hyperlipidemia)     Open leg wound     Pulmonary emboli      PAST MEDICAL HISTORY:  Anemia     DM (diabetes mellitus)     HLD (hyperlipidemia)     Open leg wound     Pulmonary emboli     Seizure disorder     Sickle cell disease     TBI (traumatic brain injury)

## 2024-07-07 NOTE — ED ADULT NURSE REASSESSMENT NOTE - ANCILLARY STATUS
VSS. Pain controlled with scheduled oral pain medication. Pumping for infant in NICU. Fundus firm and midline with light lochia rubra. Tdap vaccine administered. Voiding spontaneously with adequate output. Passing gas. Discharge orders in per OB. Mother/baby care guide, discharge instructions, and s/s of when to contact provider reviewed with pt and handouts given, understanding verbalized. Pt to follow up at OBGYN office in 1 week. All questions/concerns answered and addressed. No concerns at this time.        awaiting transport to [facility]ICU

## 2024-07-07 NOTE — CONSULT NOTE ADULT - SUBJECTIVE AND OBJECTIVE BOX
Vascular Attending: Romario        HPI:  Ms. Arriaza is a 43F with PMHx of NIDDM, PE, TBI in the past with seizure d/o, anemia, HLD, not on any meds x few months, undomiciled, poor historian, BIBA for worsening b/l feet pain and increased tingling sensation x 1d. Pt reports having chronic BLE wounds x months, last dressing change was approximately 2 wks ago at OSH. Noted worsening pain, tingling, numbness, and difficulty ambulating for the past day. States that she has been feeling progressively weaker and just laid herself down at [facility]. Denies trauma, fall, polyuria, polydipsia, polyphagia, fever, chills, N/V/D/C, abdominal pain, CP, SOB, palpitations, focal weakness, HA, dizziness, tremors, change in urinary/bowel functions, rash, AH/VH, and malaise.     Pt presents to ED with c/o numbness to extremities. PMH NIDDM as per patient. Pt appears to be poor historian and is unable to provide full history or prescribed medications. Pt is complaining of numbness to upper and lower extremities and presents with wounds to anterior aspects of b/l tibia. Upon inspection, no signs of injury present to head or neck.     ED Vitals T98.1F, /66 mm Hg, HR 98 /min, RR 16 /min, 98% O2 Sat on RA  ED Workup  CBC: WBC 15.20, H/H 9.0/28.1, Plt 255   CMP: Na 133, K3.3, HCO3 98, Cl 24, BUN/Cr 10/1.32, Glucose 235. Ca 8.5, AG 11.    ABG:    LFTs albumin 2.5, .1.    UA pH 8.0, specific gravity 1.011, no additional +findings. Urine Microscopy +squamous epithelial cells, amorphous precipitates present.   UDS, RVP negative.    EKG: Sinus tachycardia to 101, with PVCs. Left axis deviation. QTc normal, 459. No acute ST-T wave changes.    CXR, Bilateral LE tibia /fibula Xray done @[facility]: read pending.      Vascular consulted for evaluation of LE wounds and cellulitis.  Patient reports she sees a vascular surgeon at OSH but does not remember name or location.  She reports it has been a few months since she has seen the surgeon.  Reports last dressing change to wounds was 1 week prior to admission.    REVIEW OF SYSTEMS:    · Review of Systems: per HPI          PAST MEDICAL HISTORY:   Anemia , DM (diabetes mellitus) , HLD (hyperlipidemia)    Open leg wound,  Pulmonary emboli , Seizure disorder,  Sickle cell disease, TBI (traumatic brain injury).      ALLERGIES AND HOME MEDICATIONS:    Home Medications: unknown.     PAST SURGICAL HISTORY: History of exploratory thoracotomy          (07 Jul 2024 07:21)      PAST MEDICAL & SURGICAL HISTORY:  DM (diabetes mellitus)      Pulmonary emboli      HLD (hyperlipidemia)      Open leg wound      Sickle cell disease      Anemia      TBI (traumatic brain injury)      Seizure disorder      History of exploratory thoracotomy          REVIEW OF SYSTEMS  Review of Systems  CONSTITUTIONAL:  Denies Fevers / chills, sweats, fatigue, weight loss, weight gain                                      NEURO:  +parethesias, Deniesseizures, syncope, confusion                                                                                EYES:  Denies Blurry vision, discharge, pain, loss of vision                                                                                    ENMT:  Denies Difficulty hearing, vertigo, dysphagia, epistaxis, recent dental work                                       CV:  Denies Chest pain, palpitations, VILLARREAL, orthopnea                                                                                          RESPIRATORY:  Denies Wheezing, SOB, cough / sputum, hemoptysis                                                                GI:  Denies Nausea, vomiting, diarrhea, constipation, melena, difficulty swallowing                                               : Denies Hematuria, dysuria, urgency, incontinence                                                                                         MUSCULOSKELETAL:  Denies arthritis, joint swelling, muscle weakness                                                             SKIN/BREAST:  + LE ulcers, Denies rash, itching, hair loss, masses                                                                                            PSYCH:  Denies depression, anxiety, suicidal ideation                                                                                               HEME/LYMPH:  Denies bruises easily, enlarged lymph nodes, tender lymph nodes                                        ENDOCRINE:  Denies cold intolerance, heat intolerance, polydipsia 	    MEDICATIONS  (STANDING):  chlorhexidine 2% Cloths 1 Application(s) Topical <User Schedule>  clindamycin IVPB 900 milliGRAM(s) IV Intermittent every 8 hours  dextrose 10% Bolus 125 milliLiter(s) IV Bolus once  dextrose 5%. 1000 milliLiter(s) (100 mL/Hr) IV Continuous <Continuous>  dextrose 5%. 1000 milliLiter(s) (50 mL/Hr) IV Continuous <Continuous>  dextrose 50% Injectable 12.5 Gram(s) IV Push once  dextrose 50% Injectable 25 Gram(s) IV Push once  glucagon  Injectable 1 milliGRAM(s) IntraMuscular once  heparin   Injectable 7500 Unit(s) SubCutaneous every 8 hours  insulin lispro (ADMELOG) corrective regimen sliding scale   SubCutaneous Before meals and at bedtime  norepinephrine Infusion 0.1 MICROgram(s)/kG/Min (20.4 mL/Hr) IV Continuous <Continuous>  piperacillin/tazobactam IVPB.. 4.5 Gram(s) IV Intermittent every 8 hours  vancomycin  IVPB 1750 milliGRAM(s) IV Intermittent every 12 hours    MEDICATIONS  (PRN):  dextrose Oral Gel 15 Gram(s) Oral once PRN Blood Glucose LESS THAN 70 milliGRAM(s)/deciliter      Allergies    No Known Allergies    Intolerances        SOCIAL HISTORY:    FAMILY HISTORY:      Vital Signs Last 24 Hrs  T(C): 36.8 (07 Jul 2024 14:08), Max: 39.6 (07 Jul 2024 01:52)  T(F): 98.2 (07 Jul 2024 14:08), Max: 103.2 (07 Jul 2024 01:52)  HR: 89 (07 Jul 2024 16:00) (52 - 98)  BP: 91/67 (07 Jul 2024 16:00) (67/37 - 149/87)  BP(mean): 74 (07 Jul 2024 16:00) (47 - 112)  RR: 20 (07 Jul 2024 16:00) (15 - 21)  SpO2: 98% (07 Jul 2024 16:00) (94% - 100%)    Parameters below as of 07 Jul 2024 16:00  Patient On (Oxygen Delivery Method): room air        PHYSICAL EXAM:      General - Disheveled F, BMI>40,   HEENT - AT/NC, PERRLA, airway patent, neck supple  CV - S1S2, R/R/R  Pulmonary- , CTAB, symmetric bs b/l, no r/r/w  GI - soft, non tender, non distended, active bowel sounds   Extremities - 1+ BLE pitting edema, calves supple and NT, palpable symmetric distal pulses b/l  Skin - warm, poor hygiene, soiled dressing to BLE, +6x5cm and 7x5cm irregularly demarcated ulcerated wounds to BLE on the medial aspects with chronic venous stasis skin changes, no active bleeding   Neuro - somnolent but arousable to verbal stimuli, no focal neuro deficits,      LABS:                        8.7    11.63 )-----------( 233      ( 07 Jul 2024 08:05 )             27.5     07-07    133<L>  |  103  |  12  ----------------------------<  297<H>  3.7   |  22  |  1.23    Ca    7.8<L>      07 Jul 2024 08:05  Phos  3.2     07-07  Mg     1.7     07-07    TPro  7.0  /  Alb  3.0<L>  /  TBili  0.5  /  DBili  x   /  AST  118<H>  /  ALT  74<H>  /  AlkPhos  70  07-07    PT/INR - ( 07 Jul 2024 08:05 )   PT: 13.9 sec;   INR: 1.23          PTT - ( 07 Jul 2024 08:05 )  PTT:26.6 sec  Urinalysis Basic - ( 07 Jul 2024 08:05 )    Color: x / Appearance: x / SG: x / pH: x  Gluc: 297 mg/dL / Ketone: x  / Bili: x / Urobili: x   Blood: x / Protein: x / Nitrite: x   Leuk Esterase: x / RBC: x / WBC x   Sq Epi: x / Non Sq Epi: x / Bacteria: x        RADIOLOGY & ADDITIONAL STUDIES    < from: CT Lower Extremity w/wo IV Cont, Bilateral (07.07.24 @ 12:18) >  Cutaneous wounds along the medial aspects of the lower legs   bilaterally and diffuse bilateral subcutaneous soft tissue infiltration,   left greater than right, consistent with edema and/or cellulitis.      < end of copied text >

## 2024-07-07 NOTE — ED ADULT NURSE REASSESSMENT NOTE - NS ED NURSE REASSESS COMMENT FT1
pt overall vital signs improved, currently afebrile, blood pressure stable with ongoing norepinephrine IV drip running at 0.05 mcg/kg/min; urinary moran catheter also inserted per provider order; pt remains drowsy/resting with eyes closed, easily arousable by voice

## 2024-07-07 NOTE — ED PROVIDER NOTE - ATTENDING APP SHARED VISIT CONTRIBUTION OF CARE
pt is undomiciled and presents for B/L lower extremity wounds. noted to be febrile in ED, concern for sepsis. broad spectrum abxs started and cultures. BP not responding to fluids to pressor had to be started. ICU admission for septic shock.

## 2024-07-07 NOTE — ED ADULT NURSE NOTE - OBJECTIVE STATEMENT
Pt presents to ED with c/o numbness to extremities. Pt presents to ED with c/o numbness to extremities. PMH NIDDM as per patient. Pt appears to be poor historian and is unable to provide full history or prescribed medications. Pt is complaining of numbness to upper and lower extremities and presents with wounds to anterior aspects of b/l tibia. Upon inspection, no signs of injury present to head or neck. Pt reports being undomiciled and not having follow up medical visits.

## 2024-07-07 NOTE — H&P ADULT - HISTORY OF PRESENT ILLNESS
******INCOMPLETE NOTE******  · HPI   43F with PMHx of NIDDM, PE, TBI in the past with seizure d/o, SSC with anemia, HLD, not on any meds x few months, undomiciled, poor historian, BIBA for worsening b/l feet pain and increased tingling sensation x 1d. Pt reports having chronic BLE wounds x months, last dressing change was approximately 2 wks ago at OSH. Noted worsening pain, tingling, numbness, and difficulty ambulating for the past day. States that she has been feeling progressively weaker and just laid herself down at [facility]. Denies trauma, fall, polyuria, polydipsia, polyphagia, fever, chills, N/V/D/C, abdominal pain, CP, SOB, palpitations, focal weakness, HA, dizziness, tremors, change in urinary/bowel functions, rash, AH/VH, and malaise.     Pt presents to ED with c/o numbness to extremities. PMH NIDDM as per patient. Pt appears to be poor historian and is unable to provide full history or prescribed medications. Pt is complaining of numbness to upper and lower extremities and presents with wounds to anterior aspects of b/l tibia. Upon inspection, no signs of injury present to head or neck. Pt reports being undomiciled and not having follow up medical visits.     B/l LE wound care and dressing change at [facility].         ED Vitals T98.1F, /66 mm Hg, HR 98 /min, RR 16 /min, 98% O2 Sat on RA  ED Workup  CBC: WBC 15.20, H/H 9.0/28.1, Plt 255   CMP: Na 133, K3.3, HCO3 98, Cl 24, BUN/Cr 10/1.32, Glucose 235. Ca 8.5, AG 11.    ABG:    LFTs albumin 2.5, .1.    UA pH 8.0, specific gravity 1.011, no additional +findings. Urine Microscopy +squamous epithelial cells, amorphous precipitates present.   UDS, RVP negative.    EKG: Sinus tachycardia to 101, with PVCs. Left axis deviation. QTc normal, 459. No acute ST-T wave changes.    CXR, Bilateral LE tibia /fibula Xray done @[facility]: read pending.    ED INTERVENTIONS:       Interval HPI & Events: As noted.      REVIEW OF SYSTEMS:    · Review of Systems: per HPI          PAST MEDICAL HISTORY:   Anemia , DM (diabetes mellitus) , HLD (hyperlipidemia)    Open leg wound,  Pulmonary emboli , Seizure disorder,  Sickle cell disease, TBI (traumatic brain injury).      ALLERGIES AND HOME MEDICATIONS:    Home Medications: unknown.     PAST SURGICAL HISTORY: History of exploratory thoracotomy          Ms. Arriaza is a 43F with PMHx of NIDDM, PE, TBI in the past with seizure d/o, anemia, HLD, not on any meds x few months, undomiciled, poor historian, BIBA for worsening b/l feet pain and increased tingling sensation x 1d. Pt reports having chronic BLE wounds x months, last dressing change was approximately 2 wks ago at OSH. Noted worsening pain, tingling, numbness, and difficulty ambulating for the past day. States that she has been feeling progressively weaker and just laid herself down at [facility]. Denies trauma, fall, polyuria, polydipsia, polyphagia, fever, chills, N/V/D/C, abdominal pain, CP, SOB, palpitations, focal weakness, HA, dizziness, tremors, change in urinary/bowel functions, rash, AH/VH, and malaise.     Pt presents to ED with c/o numbness to extremities. PMH NIDDM as per patient. Pt appears to be poor historian and is unable to provide full history or prescribed medications. Pt is complaining of numbness to upper and lower extremities and presents with wounds to anterior aspects of b/l tibia. Upon inspection, no signs of injury present to head or neck.     ED Vitals T98.1F, /66 mm Hg, HR 98 /min, RR 16 /min, 98% O2 Sat on RA  ED Workup  CBC: WBC 15.20, H/H 9.0/28.1, Plt 255   CMP: Na 133, K3.3, HCO3 98, Cl 24, BUN/Cr 10/1.32, Glucose 235. Ca 8.5, AG 11.    ABG:    LFTs albumin 2.5, .1.    UA pH 8.0, specific gravity 1.011, no additional +findings. Urine Microscopy +squamous epithelial cells, amorphous precipitates present.   UDS, RVP negative.    EKG: Sinus tachycardia to 101, with PVCs. Left axis deviation. QTc normal, 459. No acute ST-T wave changes.    CXR, Bilateral LE tibia [facility]: read pending.    ED INTERVENTIONS:       Interval HPI & Events: As noted.      REVIEW OF SYSTEMS:    · Review of Systems: per HPI          PAST MEDICAL HISTORY:   Anemia , DM (diabetes mellitus) , HLD (hyperlipidemia)    Open leg wound,  Pulmonary emboli , Seizure disorder,  Sickle cell disease, TBI (traumatic brain injury).      ALLERGIES AND HOME MEDICATIONS:    Home Medications: unknown.     PAST SURGICAL HISTORY: History of exploratory thoracotomy

## 2024-07-08 VITALS — WEIGHT: 240.08 LBS

## 2024-07-08 LAB
ALBUMIN SERPL ELPH-MCNC: 2.9 G/DL — LOW (ref 3.3–5)
ALP SERPL-CCNC: 93 U/L — SIGNIFICANT CHANGE UP (ref 40–120)
ALT FLD-CCNC: 110 U/L — HIGH (ref 10–45)
ANION GAP SERPL CALC-SCNC: 9 MMOL/L — SIGNIFICANT CHANGE UP (ref 5–17)
AST SERPL-CCNC: 87 U/L — HIGH (ref 10–40)
BILIRUB SERPL-MCNC: 0.4 MG/DL — SIGNIFICANT CHANGE UP (ref 0.2–1.2)
BLD GP AB SCN SERPL QL: NEGATIVE — SIGNIFICANT CHANGE UP
BUN SERPL-MCNC: 7 MG/DL — SIGNIFICANT CHANGE UP (ref 7–23)
CALCIUM SERPL-MCNC: 8.7 MG/DL — SIGNIFICANT CHANGE UP (ref 8.4–10.5)
CHLORIDE SERPL-SCNC: 106 MMOL/L — SIGNIFICANT CHANGE UP (ref 96–108)
CO2 SERPL-SCNC: 23 MMOL/L — SIGNIFICANT CHANGE UP (ref 22–31)
CREAT SERPL-MCNC: 0.94 MG/DL — SIGNIFICANT CHANGE UP (ref 0.5–1.3)
EGFR: 77 ML/MIN/1.73M2 — SIGNIFICANT CHANGE UP
GLUCOSE BLDC GLUCOMTR-MCNC: 221 MG/DL — HIGH (ref 70–99)
GLUCOSE SERPL-MCNC: 205 MG/DL — HIGH (ref 70–99)
HCT VFR BLD CALC: 26 % — LOW (ref 34.5–45)
HGB BLD-MCNC: 8.3 G/DL — LOW (ref 11.5–15.5)
MAGNESIUM SERPL-MCNC: 1.9 MG/DL — SIGNIFICANT CHANGE UP (ref 1.6–2.6)
MCHC RBC-ENTMCNC: 24.9 PG — LOW (ref 27–34)
MCHC RBC-ENTMCNC: 31.9 GM/DL — LOW (ref 32–36)
MCV RBC AUTO: 78.1 FL — LOW (ref 80–100)
NRBC # BLD: 0 /100 WBCS — SIGNIFICANT CHANGE UP (ref 0–0)
PHOSPHATE SERPL-MCNC: 3 MG/DL — SIGNIFICANT CHANGE UP (ref 2.5–4.5)
PLATELET # BLD AUTO: 221 K/UL — SIGNIFICANT CHANGE UP (ref 150–400)
POTASSIUM SERPL-MCNC: 3.6 MMOL/L — SIGNIFICANT CHANGE UP (ref 3.5–5.3)
POTASSIUM SERPL-SCNC: 3.6 MMOL/L — SIGNIFICANT CHANGE UP (ref 3.5–5.3)
PROT SERPL-MCNC: 6.8 G/DL — SIGNIFICANT CHANGE UP (ref 6–8.3)
RBC # BLD: 3.33 M/UL — LOW (ref 3.8–5.2)
RBC # FLD: 17.1 % — HIGH (ref 10.3–14.5)
RH IG SCN BLD-IMP: POSITIVE — SIGNIFICANT CHANGE UP
SODIUM SERPL-SCNC: 138 MMOL/L — SIGNIFICANT CHANGE UP (ref 135–145)
WBC # BLD: 10.7 K/UL — HIGH (ref 3.8–10.5)
WBC # FLD AUTO: 10.7 K/UL — HIGH (ref 3.8–10.5)

## 2024-07-08 PROCEDURE — 99223 1ST HOSP IP/OBS HIGH 75: CPT

## 2024-07-08 PROCEDURE — 87637 SARSCOV2&INF A&B&RSV AMP PRB: CPT

## 2024-07-08 PROCEDURE — 81001 URINALYSIS AUTO W/SCOPE: CPT

## 2024-07-08 PROCEDURE — 99233 SBSQ HOSP IP/OBS HIGH 50: CPT | Mod: GC

## 2024-07-08 PROCEDURE — 82962 GLUCOSE BLOOD TEST: CPT

## 2024-07-08 PROCEDURE — 87040 BLOOD CULTURE FOR BACTERIA: CPT

## 2024-07-08 PROCEDURE — 80053 COMPREHEN METABOLIC PANEL: CPT

## 2024-07-08 PROCEDURE — 80307 DRUG TEST PRSMV CHEM ANLYZR: CPT

## 2024-07-08 PROCEDURE — 85730 THROMBOPLASTIN TIME PARTIAL: CPT

## 2024-07-08 PROCEDURE — 85027 COMPLETE CBC AUTOMATED: CPT

## 2024-07-08 PROCEDURE — 82803 BLOOD GASES ANY COMBINATION: CPT

## 2024-07-08 PROCEDURE — 36569 INSJ PICC 5 YR+ W/O IMAGING: CPT

## 2024-07-08 PROCEDURE — 84100 ASSAY OF PHOSPHORUS: CPT

## 2024-07-08 PROCEDURE — 85025 COMPLETE CBC W/AUTO DIFF WBC: CPT

## 2024-07-08 PROCEDURE — 71045 X-RAY EXAM CHEST 1 VIEW: CPT

## 2024-07-08 PROCEDURE — 73590 X-RAY EXAM OF LOWER LEG: CPT

## 2024-07-08 PROCEDURE — C1751: CPT

## 2024-07-08 PROCEDURE — 73702 CT LWR EXTREMITY W/O&W/DYE: CPT | Mod: MC

## 2024-07-08 PROCEDURE — 36415 COLL VENOUS BLD VENIPUNCTURE: CPT

## 2024-07-08 PROCEDURE — 86140 C-REACTIVE PROTEIN: CPT

## 2024-07-08 PROCEDURE — 83605 ASSAY OF LACTIC ACID: CPT

## 2024-07-08 PROCEDURE — 99285 EMERGENCY DEPT VISIT HI MDM: CPT

## 2024-07-08 PROCEDURE — 86900 BLOOD TYPING SEROLOGIC ABO: CPT

## 2024-07-08 PROCEDURE — 86901 BLOOD TYPING SEROLOGIC RH(D): CPT

## 2024-07-08 PROCEDURE — 83735 ASSAY OF MAGNESIUM: CPT

## 2024-07-08 PROCEDURE — 82550 ASSAY OF CK (CPK): CPT

## 2024-07-08 PROCEDURE — 83036 HEMOGLOBIN GLYCOSYLATED A1C: CPT

## 2024-07-08 PROCEDURE — 85610 PROTHROMBIN TIME: CPT

## 2024-07-08 PROCEDURE — 86850 RBC ANTIBODY SCREEN: CPT

## 2024-07-08 PROCEDURE — 96374 THER/PROPH/DIAG INJ IV PUSH: CPT

## 2024-07-08 PROCEDURE — 70450 CT HEAD/BRAIN W/O DYE: CPT | Mod: MC

## 2024-07-08 PROCEDURE — 96375 TX/PRO/DX INJ NEW DRUG ADDON: CPT

## 2024-07-08 RX ORDER — SULFAMETHOXAZOLE AND TRIMETHOPRIM 800; 160 MG/1; MG/1
2 TABLET ORAL
Qty: 40 | Refills: 0
Start: 2024-07-08 | End: 2024-07-17

## 2024-07-08 RX ORDER — ENOXAPARIN SODIUM 100 MG/ML
40 INJECTION SUBCUTANEOUS EVERY 24 HOURS
Refills: 0 | Status: DISCONTINUED | OUTPATIENT
Start: 2024-07-08 | End: 2024-07-08

## 2024-07-08 RX ORDER — HEPARIN SODIUM 50 [USP'U]/ML
5000 INJECTION, SOLUTION INTRAVENOUS EVERY 8 HOURS
Refills: 0 | Status: DISCONTINUED | OUTPATIENT
Start: 2024-07-08 | End: 2024-07-08

## 2024-07-08 RX ORDER — ENOXAPARIN SODIUM 100 MG/ML
40 INJECTION SUBCUTANEOUS EVERY 12 HOURS
Refills: 0 | Status: DISCONTINUED | OUTPATIENT
Start: 2024-07-08 | End: 2024-07-08

## 2024-07-08 RX ORDER — POTASSIUM CHLORIDE 600 MG/1
40 TABLET, FILM COATED, EXTENDED RELEASE ORAL ONCE
Refills: 0 | Status: COMPLETED | OUTPATIENT
Start: 2024-07-08 | End: 2024-07-08

## 2024-07-08 RX ADMIN — POTASSIUM CHLORIDE 40 MILLIEQUIVALENT(S): 600 TABLET, FILM COATED, EXTENDED RELEASE ORAL at 07:47

## 2024-07-08 RX ADMIN — PIPERACILLIN SODIUM AND TAZOBACTAM SODIUM 25 GRAM(S): 3; .375 INJECTION, POWDER, LYOPHILIZED, FOR SOLUTION INTRAVENOUS at 05:06

## 2024-07-08 RX ADMIN — Medication 650 MILLIGRAM(S): at 00:14

## 2024-07-08 RX ADMIN — CLINDAMYCIN PHOSPHATE 112 MILLIGRAM(S): 150 INJECTION, SOLUTION INTRAVENOUS at 05:06

## 2024-07-08 RX ADMIN — INSULIN LISPRO 4: 100 INJECTION, SOLUTION SUBCUTANEOUS at 07:46

## 2024-07-08 RX ADMIN — HEPARIN SODIUM 7500 UNIT(S): 50 INJECTION, SOLUTION INTRAVENOUS at 06:24

## 2024-07-08 RX ADMIN — Medication 1 APPLICATION(S): at 05:07

## 2024-07-08 NOTE — BH CONSULTATION LIAISON ASSESSMENT NOTE - NSBHCONSULTRECOMMENDOTHER_PSY_A_CORE FT
- Consider Aripiprazole 5mg PO  - Recommend Aripiprazole 5mg PO for standing treatment of bipolar disorder   - Recommend Haldol 5mg IV /Ativan 2mg IV PRN for agitation  - No indication for psychiatric hospitalization at this time, as pt denies SI/HI/AVH; she is somewhat circumstantial on interview with some bizarre thought content but appears reality-based when her medical issues and reasons for medical hospitalization are discussed. No psychiatric barriers to discharge.  - Pt would benefit from outpatient psychiatric treatment- see referral list below.  - Recommend starting Aripiprazole 5mg PO for standing treatment of likely underlying primary psychotic disorder  - Recommend Haldol 5mg/Ativan 2mg IM q6h PRN for agitation not responsive to verbal redirection  - No information found in [facility] under chart name or pt's reported name (Charlotte Fitch,  1981).  - Further hx required re: opiate use disorder; pt denies currently.  - Psychiatry will continue to follow

## 2024-07-08 NOTE — DIETITIAN INITIAL EVALUATION ADULT - PERTINENT MEDS FT
MEDICATIONS  (STANDING):  chlorhexidine 2% Cloths 1 Application(s) Topical <User Schedule>  clindamycin IVPB 900 milliGRAM(s) IV Intermittent every 8 hours  dextrose 10% Bolus 125 milliLiter(s) IV Bolus once  dextrose 5%. 1000 milliLiter(s) (100 mL/Hr) IV Continuous <Continuous>  dextrose 5%. 1000 milliLiter(s) (50 mL/Hr) IV Continuous <Continuous>  dextrose 50% Injectable 12.5 Gram(s) IV Push once  dextrose 50% Injectable 25 Gram(s) IV Push once  enoxaparin Injectable 40 milliGRAM(s) SubCutaneous every 24 hours  glucagon  Injectable 1 milliGRAM(s) IntraMuscular once  insulin lispro (ADMELOG) corrective regimen sliding scale   SubCutaneous Before meals and at bedtime  piperacillin/tazobactam IVPB.. 4.5 Gram(s) IV Intermittent every 8 hours  vancomycin  IVPB 1750 milliGRAM(s) IV Intermittent every 12 hours    MEDICATIONS  (PRN):  acetaminophen     Tablet .. 650 milliGRAM(s) Oral every 6 hours PRN Temp greater or equal to 38C (100.4F)  dextrose Oral Gel 15 Gram(s) Oral once PRN Blood Glucose LESS THAN 70 milliGRAM(s)/deciliter

## 2024-07-08 NOTE — BH CONSULTATION LIAISON ASSESSMENT NOTE - DETAILS
Patient has never met her mother, but it is believed that her mother had a history of schizophrenia  Patient says that she was raised by her aunt and never met her mom. She then asks to change the subject, as her aunt passed away

## 2024-07-08 NOTE — BH CONSULTATION LIAISON ASSESSMENT NOTE - OTHER PAST PSYCHIATRIC HISTORY (INCLUDE DETAILS REGARDING ONSET, COURSE OF ILLNESS, INPATIENT/OUTPATIENT TREATMENT)
History not fully clear as patient is not a good historian (see HPI). As per patient, she has a history of TBI, depression, and a mood disorder. She has been hospitalized for psychiatric reasons in the past, but cannot recall the date of her last hospitalization. The first time she was hospitalized was when she was 9 years old. She has seen psychiatrists and therapists in the past, but is currently not following with any providers. She has also been on psychiatric medications in the past, but says that due to her TBI, she cannot recall the medications, but she does know that Lithium is not a good treatment choice for her. She had one act of self harm at around age 14, but denies any other self harm/suicide attempts since. She has had suicidal thoughts in the past, but denies any current SI. Her mother may have a history of schizophrenia, but it is not clear, as the patient has never met her mother. Patient denies any past or current hallucinations.

## 2024-07-08 NOTE — PROCEDURE NOTE - ADDITIONAL PROCEDURE DETAILS
USPIV 20g 1.88inch inserted to R AC x 1 attempt, patient tolerated well.
USPIV 20g 1.88inch inserted to R forearm x 1 attempt, patient tolerated well.
Left forearm venipuncture x 1 attempt

## 2024-07-08 NOTE — BH CONSULTATION LIAISON ASSESSMENT NOTE - NSBHREFERDETAILS_PSY_A_CORE_FT
Patient states that she has a TBI, ?seizure disorder, and depression. She was agitated earlier today, prompting psychiatry consultation for medication recommendation/management

## 2024-07-08 NOTE — BH CONSULTATION LIAISON ASSESSMENT NOTE - HPI (INCLUDE ILLNESS QUALITY, SEVERITY, DURATION, TIMING, CONTEXT, MODIFYING FACTORS, ASSOCIATED SIGNS AND SYMPTOMS)
43F with PMHx of non-insulin depedent diabetes mellitis, PE, TBI in the past with seizure d/o, sickle cell disease with anemia, HLD, not on any meds x few months, undomiciled, poor historian, presented to [facility] with severe b/l LE open wounds requiring admission for cellulitis, and then transferred to [facility] MICU for septic shock from cellulitis requiring pressor support and currently being treated with Vanc,  Zosyn and Clindamycin. Now, off pressor requirement and ready for step down to the floors. Pending cultures, sensitives to narrow antibiotics. Psychiatry was consulted due to patient's agitation, aggression, and psychotic behaviors during today's rounds in the MICU.     Patient was sitting in chair while interviewed. She was mainly calm, but towards the end of the interview, patient seemed to be getting frustrated w/ the questions being asked, as she began to raise her voice, then yelling at the psychiatry team to leave her room. She said that her wounds are getting better, and she currently has no psychiatric concerns. Patient says that she has a history of a TBI, about 7-8 years ago, and she is unsure of the cause. Patient does not remember how or exactly when the TBI occurred, and she says because of the history, she sometimes has difficulty remembering information, and it may take her a little while to think of answers when asked questions. She states multiple times that she does not have Alzheimer's or Dementia, and she says that she does lots of her own research regarding her condition. While attempting to get more information regarding the TBI, patient began to get frustrated and stated that she no longer wanted to talk about it, saying that she will no longer answer any more questions about the TBI. Her chart says she has a history of a seizure disorder, but patient denies this diagnoses and says she has never had a seizure in the past. She says that she has depression and a mood disorder. She was asked if the mood disorder was bipolar disorder, in which she said "bipolar is a mood disorder", and that the psychiatry team "should know that". She denies any past/present auditory or visual hallucinations, and states that her mother may have been diagnosed w/ schizophrenia, but she is not positive because she has never met her mom, as she was raised by her aunt.     When asked about past psychiatric hospitalizations, patient says that she has been admitted to psych wards before, but she has never presented to the hospital for a psychiatric complaint. Patient alluded to the time she went to the hospital complaining of left sided chest pain, where she was found to have a PE, but she was then also admitted to the psychiatric unit, even though she originally presented for a medical reason. Her first psychiatric admission was when she was 9 years old, but she cannot recall the reason as to why she was admitted. She has been on psychiatric medications in the past, but she is currently unmedicated, and cannot recall the last time she was on medications.  She says she has difficulty recalling her medications, due to her TBI, but does remember that she does not tolerate lithium well. Patient says that she first saw a psychiatrist when she was 13 years old, which is when she was diagnosed w/ depression, but she does not currently see a psychiatrist, but says that she would like to see one, as well as a therapist. She says she has had suicidal thoughts in the past, but it was "years ago", denying any current suicidal ideation. She denies any past suicide attempts, but admits to one act of self harm, where she cut her forearm when she was around 14 years old.     When assessing orientation, patient said that today was the 8th, and then guessed that it was May because of the "nice weather". She said the year was 2004, and then went on to say that she cannot tell us the street address because she is not familiar with this area. Patient says although she is homeless, she is still eating and drinking, and she denies any drug or alcohol use. She also says that because she is "a nice person", she is able to find people on the streets to give her cigarettes. Patient says she probably smokes more than 1 pack of Newports each day, and she has been smoking cigarettes since she was 13 years old.     Upon questioning the patient, in attempts to get further details on her TBI and psychiatric history, the patient began getting agitated, raising her voice and saying that she was not answering any more questions.

## 2024-07-08 NOTE — BH CONSULTATION LIAISON ASSESSMENT NOTE - UNDOMICILED
Patient says she has never had a place of her own. She says she stays "wherever the train takes me", depending on how much money she has to utilize public transport. She has always lived in [place], and was born in [place], but she says she currently does not stay in one specific neighborhood, and she also occasionally will spend time in other [facility] boroughs

## 2024-07-08 NOTE — PROGRESS NOTE ADULT - ASSESSMENT
A/P:   43F with PMHx of NIDDM, PE, TBI in the past with seizure d/o, anemia, HLD, not on any meds x few months, undomiciled, poor historian, BIBA for worsening b/l feet pain and increased tingling sensation x 1d. Pt reports having chronic BLE wounds x months    -C/W Dressings with wet to dry gauze with vashe and wrap with kerlix.   -No acute vascular intervention required.  -Vascular will sign-off at this time  [phone] for any questions. A/P:   43F with PMHx of NIDDM, PE, TBI in the past with seizure d/o, anemia, HLD, not on any meds x few months, undomiciled, poor historian, BIBA for worsening b/l feet pain and increased tingling sensation x 1d. Pt reports having chronic BLE wounds x months    -C/W Dressings with wet to dry gauze with vashe and wrap with kerlix with nursing.  -No acute vascular intervention required.  -Vascular will sign-off at this time  -[phone] for any questions.

## 2024-07-08 NOTE — PATIENT PROFILE ADULT - FALL HARM RISK - HARM RISK INTERVENTIONS

## 2024-07-08 NOTE — PROGRESS NOTE ADULT - ASSESSMENT
ASSESSMENT  43F with PMHx of NIDDM, PE, TBI in the past with seizure d/o, sickle cell disease with anemia, HLD, not on any meds x few months, undomiciled, poor historian, presented to [facility] with severe b/l LE open wounds requiring admission for cellulitis, and then transferred to [facility] MICU for septic shock from cellulitis requiring pressor support and currently being treated with Vanc,  Zosyn and Clindamycin.    PLAN    NEURO   #Metabolic Encephalopathy (resolved)  On initial presentation patient was altered likely due to septic shock in the setting of cellulitis. Also was concern due to a mechanical fall before arriving to the hospital, however CT head concluded no acute intracranial findings. Today (7/8) patient was oriented to person, place, time, and situation.   - ID management see ID section     CARDIOLOGY  #Hypotension: Patient found to have fevers, tachycardia. Extremities warm throughout with improvment after 4 L fluid repletion but notably required norepinephrine to maintain map > 60.   Secondary to septic shock iso b/l LE cellulitis. MAPs >60 since on Levo.   -Levo ggt, titrate to stable MAPs  -Dopplers b/l LE given history of blood clots    #HLD  -Obtain med rec for home meds      PULM  -ANEUDY    GI   #Deconditioned  Secondary to malnutrition & poor PO intake  NG tube placed.   Low concern for re-feeding syndrome given normal BMP.   -Obtain nutrition consult  -Obtain confirmatory CXR for NGT      ID  #Septic Shock #Cellulitis  Cellulitis with likely b/l LE open wounds as source.  CT LE consistent with cellulitis.   -Zosyn, Vancomycin, clindamycin for broad spectrum cellulitis coverage, pending further culture and sensitives   -F/u BCx, consider CT LE to r/o necrotizing fascitis.   -    #Ulcers #Open Wounds  - BL shin with eschars with hypertrophic skin changes  -Pressure balancing mattress  -Serial wound checks  - Consult vascular     ENDO  #NIDDM  Unable to provide history. No records on HIE checks.   - Glucose 297 this morning (07/07)  - A1c 10.2  - Nonurgent endocrine consult for DM management  -FS  -Obtain collateral regarding disease course, home medications, med rec.     HEMEONC  #Hx of SCC  -Obtain collateral, med rec for course     RHEUM  #Fall  -Place fall precautions    RENAL   - stable  - ANEUDY      Px  F: Hydrate per fluid status needs  E: Replete as needed  N: Diet per nutrition assessment  DVT ppx: Heparin    FULL CODE

## 2024-07-08 NOTE — DIETITIAN INITIAL EVALUATION ADULT - OTHER INFO
43F with PMHx of NIDDM, PE, TBI in the past with seizure d/o, SSC with anemia, HLD, not on any meds x few months, undomiciled, poor historian, presented to [facility] with severe b/l LE open wounds requiring admission for cellulitis, and then transferred to [facility] MICU for septic shock from cellulitis requiring pressor support and currently being treated with Vanc,  Zosyn and Clindamycin.    Pt care discussed in interdisciplinary care team rounds. Rx and labs reviewed. Pt presents with no overt signs of nutritional wasting. Today, pt yelling at PCA and refusing care; inappropriate for nutrition interview at this time. Spoke with RN who notes pt is eating fine and no active nutritional concerns. Pt ordered for a gestational diabetes diet. Confirmed pt is not pregnant with care team; plan to change to standard consistent carbohydrate diet with HS snack. No reported complaints of nausea/vomiting/constipation/diarrhea or difficult chew/swallow. NKA to food per chart review. RDN will continue to reassess, intervene, and monitor as appropriate.     Pain:0 per chart review   GI: Abdomen non-distended/non-tender, +BS x4, last bowel movement PTA  Skin: bilateral shin venous ulceration, +1 generalized edema

## 2024-07-08 NOTE — BH CONSULTATION LIAISON ASSESSMENT NOTE - NSBHATTESTCOMMENTATTENDFT_PSY_A_CORE
Seen with ALBERTO. Seen with EVON  43F with PMHx of DM, PE, TBI in the past with seizure d/o, unclear psychiatric history but reports hx of depression, not on any meds x few months, undomiciled, poor historian, admitted to the MICU for treatment for septic shock from cellulitis/bilateral LE wounds. Per team, during hospitalization pt was initially sedated, was given narcan and then became more agitated, however utox negative. Psychiatry consulted given concern for psychotic thought process and periods of agitation. On exam, pt initially cooperative but quickly became irritable. She displays some bizarre thought content and endorses hx of depression and "mood disorder", endorses med trials of depakote and zyprexa in the past. Reports she does not remember her most recent psychiatric hospitalization or medication trials.   She does display a linear understanding of her medical issues and reasons for current hospitalization, remains in behavioral control, and denies SI/HI/AVH. She is amenable to receiving outpatient psychiatric referrals.  Given disorganization of thought process on exam, it is likely pt suffers from underlying schizophrenia vs schizoaffective disorder. While admitted, would recommend initiating antipsychotic treatment with abilify 5mg po. Will continue to attempt gathering collateral for further psychiatric history.  - No indication for psychiatric hospitalization at this time, as pt denies SI/HI/AVH; she is somewhat circumstantial on interview with some bizarre thought content but appears reality-based when her medical issues and reasons for medical hospitalization are discussed. No psychiatric barriers to discharge.

## 2024-07-08 NOTE — PROGRESS NOTE ADULT - ASSESSMENT
ASSESSMENT  43F with PMHx of non-insulin depedent diabetes mellitis, PE, TBI in the past with seizure d/o, sickle cell disease with anemia, HLD, not on any meds x few months, undomiciled, poor historian, presented to [facility] with severe b/l LE open wounds requiring admission for cellulitis, and then transferred to [facility] MICU for septic shock from cellulitis requiring pressor support and currently being treated with Vanc,  Zosyn and Clindamycin. Now, off pressor requirement and ready for step down to the floors. Pending cultures, sensitives to narrow antibiotics.    PLAN    NEURO   #Metabolic Encephalopathy (resolved)  On initial presentation patient was altered likely due to septic shock in the setting of cellulitis. Also was concern due to a mechanical fall before arriving to the hospital, however CT head concluded no acute intracranial findings.   Today (7/8) patient was oriented to person, place, time, and situation.   - ID management see ID section     CARDIOLOGY  #Hypotension (resolved)  Patient found to have fevers, tachycardia. Extremities warm throughout with improvment after 4 L fluid repletion but notably required norepinephrine to maintain map > 60.   Secondary to septic shock iso b/l LE cellulitis. MAPs >60 since on Levo.   -Discontinue Levo ggt    #HLD  -Obtain med rec for home meds      PULM  -ANEUDY    GI   #Deconditioned  Secondary to malnutrition & poor PO intake  NG tube placed.   Low concern for re-feeding syndrome given normal BMP on 7/8   -Obtain nutrition consult  -Obtain confirmatory CXR for NGT    ID  #Septic Shock #Cellulitis  Cellulitis with likely b/l LE open wounds as source.  CT LE consistent with cellulitis with no concern for necrotizing fascitis.  -Zosyn, Vancomycin, clindamycin for broad spectrum cellulitis coverage, pending further culture and sensitives     #Ulcers #Open Wounds  BL shin with eschars with hypertrophic skin changes.   -Pressure balancing mattress  -Serial wound checks  - Per vascular 7/8/24  ---- C/W Dressings with wet to dry gauze with vashe and wrap with kerlix with nursing.  ---- No acute vascular intervention required.    ENDO  #Non insulin dependent diabetes mellitus   Unable to provide history. No records on HIE checks. (may need to re-check) patient's last name may be different than what was originally obtained. Per psych last name is Constantine   - Glucose 297 this morning (07/07)  - A1c 10.2  - Nonurgent endocrine consult for DM management  -FS  -Obtain collateral regarding disease course, home medications, med rec.     [facility]  #[facility] of [facility] Disease   -Obtain collateral, med rec for course     MSK  - ANEUDY    RENAL   - ANEUDY    PSYCH  Acutely agitated on exam. Patient denies any history of psychiatric disorders Appears we may have the wrong last name for the patient. Last name is Constantine and birth year in 1981.   - Pending psych reccs on an antipsychotic or collateral on any psych history   - Pending psych reccs for a PRN for agitation     Px  F: Hydrate per fluid status needs  E: Replete as needed  N: Diet per nutrition assessment  DVT ppx: Lovenox  FULL CODE

## 2024-07-08 NOTE — BH CONSULTATION LIAISON ASSESSMENT NOTE - DESCRIPTION (FIRST USE, LAST USE, QUANTITY, FREQUENCY, DURATION)
Patient says she probably smokes over 1 pack of cigarettes a day. 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

## 2024-07-08 NOTE — PROCEDURE NOTE - NSPOSTCAREGUIDE_GEN_A_CORE
Care for catheter as per unit/ICU protocols
Verbal/written post procedure instructions were given to patient/caregiver/Instructed patient/caregiver to follow-up with primary care physician/Instructed patient/caregiver regarding signs and symptoms of infection/Keep the cast/splint/dressing clean and dry/Care for catheter as per unit/ICU protocols
Care for catheter as per unit/ICU protocols
Care for catheter as per unit/ICU protocols

## 2024-07-08 NOTE — BH CONSULTATION LIAISON ASSESSMENT NOTE - RISK ASSESSMENT
Chronic risk factors include past psychiatric diagnoses, hx of psychiatric hospitalizations, and homelessness. Acute risk is not elevated - pt denies SI/HI/AVH and is future oriented.

## 2024-07-08 NOTE — BH CONSULTATION LIAISON ASSESSMENT NOTE - CURRENT MEDICATION
MEDICATIONS  (STANDING):  chlorhexidine 2% Cloths 1 Application(s) Topical <User Schedule>  clindamycin IVPB 900 milliGRAM(s) IV Intermittent every 8 hours  dextrose 10% Bolus 125 milliLiter(s) IV Bolus once  dextrose 5%. 1000 milliLiter(s) (100 mL/Hr) IV Continuous <Continuous>  dextrose 5%. 1000 milliLiter(s) (50 mL/Hr) IV Continuous <Continuous>  dextrose 50% Injectable 12.5 Gram(s) IV Push once  dextrose 50% Injectable 25 Gram(s) IV Push once  glucagon  Injectable 1 milliGRAM(s) IntraMuscular once  heparin   Injectable 5000 Unit(s) SubCutaneous every 8 hours  insulin lispro (ADMELOG) corrective regimen sliding scale   SubCutaneous Before meals and at bedtime  piperacillin/tazobactam IVPB.. 4.5 Gram(s) IV Intermittent every 8 hours  vancomycin  IVPB 1750 milliGRAM(s) IV Intermittent every 12 hours    MEDICATIONS  (PRN):  acetaminophen     Tablet .. 650 milliGRAM(s) Oral every 6 hours PRN Temp greater or equal to 38C (100.4F)  dextrose Oral Gel 15 Gram(s) Oral once PRN Blood Glucose LESS THAN 70 milliGRAM(s)/deciliter

## 2024-07-08 NOTE — BH CONSULTATION LIAISON ASSESSMENT NOTE - NSCOMMENTSUICRISKFACT_PSY_ALL_CORE
None
Psychiatric history not fully know, as patient does not seem to be a great historian. Further information on the patient was not found on PSYCKES

## 2024-07-08 NOTE — DIETITIAN INITIAL EVALUATION ADULT - ADD RECOMMEND
-Change diet to consistent carbohydrate diet with HS snack   -Encourage good PO intake  -Honor food preferences as able  -Weekly wts  -Monitor chemistry, GI function, and skin integrity

## 2024-07-08 NOTE — PROGRESS NOTE ADULT - SUBJECTIVE AND OBJECTIVE BOX
Febrile 101 overnight.   Afebrile, hemodynamically stable     Subjective: Patient seen and examined at bedside.    ICU Vital Signs Last 24 Hrs  T(C): 36.7 (08 Jul 2024 09:11), Max: 38.3 (07 Jul 2024 23:01)  T(F): 98 (08 Jul 2024 09:11), Max: 101 (07 Jul 2024 23:01)  HR: 83 (08 Jul 2024 10:00) (64 - 103)  BP: 106/59 (08 Jul 2024 09:00) (77/51 - 118/73)  BP(mean): 76 (08 Jul 2024 09:00) (57 - 90)  ABP: --  ABP(mean): --  RR: 18 (08 Jul 2024 09:00) (14 - 20)  SpO2: 100% (08 Jul 2024 10:00) (97% - 100%)    O2 Parameters below as of 08 Jul 2024 09:00  Patient On (Oxygen Delivery Method): room air          I&O's Summary    07 Jul 2024 07:01  -  08 Jul 2024 07:00  --------------------------------------------------------  IN: 1840.4 mL / OUT: 3040 mL / NET: -1199.6 mL          PHYSICAL EXAM:  GENERAL: Agitated loud in discussion, appears annoyed/violent with staff, disheveled   HEAD:  Atraumatic, Normocephalic  EYES: EOMI, PERRLA, conjunctiva and sclera clear, normal pupils  ENMT: No tonsillar erythema, exudates, or enlargement; Moist mucous membranes  NECK: Supple, No JVD, Normal thyroid  HEART: Regular rate and rhythm; No murmurs, rubs, or gallops  RESPIRATORY: CTA B/L, No W/R/R  ABDOMEN: Soft, Nontender, Nondistended; Bowel sounds present  NEUROLOGY: A&Ox3, nonfocal, moving all extremities  EXTREMITIES:  2+ Peripheral Pulses, No clubbing, cyanosis, 1-2+ pitting edema BL on LE  SKIN: warm, dry, normal color, no rash or abnormal lesions    LABS:                        8.3    10.70 )-----------( 221      ( 08 Jul 2024 06:31 )             26.0     07-08    138  |  106  |  7   ----------------------------<  205<H>  3.6   |  23  |  0.94    Ca    8.7      08 Jul 2024 06:31  Phos  3.0     07-08  Mg     1.9     07-08    TPro  6.8  /  Alb  2.9<L>  /  TBili  0.4  /  DBili  x   /  AST  87<H>  /  ALT  110<H>  /  AlkPhos  93  07-08    PT/INR - ( 07 Jul 2024 08:05 )   PT: 13.9 sec;   INR: 1.23          PTT - ( 07 Jul 2024 08:05 )  PTT:26.6 sec  Urinalysis Basic - ( 08 Jul 2024 06:31 )    Color: x / Appearance: x / SG: x / pH: x  Gluc: 205 mg/dL / Ketone: x  / Bili: x / Urobili: x   Blood: x / Protein: x / Nitrite: x   Leuk Esterase: x / RBC: x / WBC x   Sq Epi: x / Non Sq Epi: x / Bacteria: x      CAPILLARY BLOOD GLUCOSE      POCT Blood Glucose.: 221 mg/dL (08 Jul 2024 07:44)  POCT Blood Glucose.: 158 mg/dL (07 Jul 2024 21:19)  POCT Blood Glucose.: 207 mg/dL (07 Jul 2024 16:35)    ABG - ( 07 Jul 2024 08:08 )  pH, Arterial: 7.36  pH, Blood: x     /  pCO2: 36    /  pO2: 91    / HCO3: 20    / Base Excess: -4.5  /  SaO2: 98.6                Consultant(s) Notes Reviewed:  [x ] YES  [ ] NO    MEDICATIONS  (STANDING):  chlorhexidine 2% Cloths 1 Application(s) Topical <User Schedule>  clindamycin IVPB 900 milliGRAM(s) IV Intermittent every 8 hours  dextrose 10% Bolus 125 milliLiter(s) IV Bolus once  dextrose 5%. 1000 milliLiter(s) (100 mL/Hr) IV Continuous <Continuous>  dextrose 5%. 1000 milliLiter(s) (50 mL/Hr) IV Continuous <Continuous>  dextrose 50% Injectable 12.5 Gram(s) IV Push once  dextrose 50% Injectable 25 Gram(s) IV Push once  enoxaparin Injectable 40 milliGRAM(s) SubCutaneous every 24 hours  glucagon  Injectable 1 milliGRAM(s) IntraMuscular once  insulin lispro (ADMELOG) corrective regimen sliding scale   SubCutaneous Before meals and at bedtime  piperacillin/tazobactam IVPB.. 4.5 Gram(s) IV Intermittent every 8 hours  vancomycin  IVPB 1750 milliGRAM(s) IV Intermittent every 12 hours    MEDICATIONS  (PRN):  acetaminophen     Tablet .. 650 milliGRAM(s) Oral every 6 hours PRN Temp greater or equal to 38C (100.4F)  dextrose Oral Gel 15 Gram(s) Oral once PRN Blood Glucose LESS THAN 70 milliGRAM(s)/deciliter      Care Discussed with Consultants/Other Providers [ x] YES  [ ] NO    RADIOLOGY & ADDITIONAL TESTS:
DAILY PROGRESS NOTE    S:  Patient dressings changed bedside today. NAC.    O:     T(C): 36.9 (07-08-24 @ 06:00), Max: 38.3 (07-07-24 @ 23:01)  HR: 90 (07-08-24 @ 08:00) (55 - 103)  BP: 106/66 (07-08-24 @ 07:00) (77/51 - 139/63)  RR: 17 (07-08-24 @ 07:00) (14 - 21)  SpO2: 100% (07-08-24 @ 08:00) (94% - 100%)    Physical Exam:     General - Disheveled F, BMI>40,   HEENT - AT/NC, PERRLA, airway patent, neck supple  CV - S1S2, R/R/R  Pulmonary- , CTAB, symmetric bs b/l, no r/r/w  GI - soft, non tender, non distended, active bowel sounds   Extremities - 1+ BLE pitting edema, calves supple and NT, palpable symmetric distal pulses b/l  Skin - warm, poor hygiene, soiled dressing to BLE, +6x5cm and 7x5cm irregularly demarcated ulcerated wounds to BLE on the medial aspects, minimal granulation tissue, clean wound bases with no purulence noted.       Labs:     LABS:  07-07 @ 08:05 Creatine 637 U/L<H> [25 - 170]  cret                        8.3    10.70 )-----------( 221      ( 08 Jul 2024 06:31 )             26.0     07-08    138  |  106  |  7   ----------------------------<  205<H>  3.6   |  23  |  0.94    Ca    8.7      08 Jul 2024 06:31  Phos  3.0     07-08  Mg     1.9     07-08    TPro  6.8  /  Alb  2.9<L>  /  TBili  0.4  /  DBili  x   /  AST  87<H>  /  ALT  110<H>  /  AlkPhos  93  07-08    PT/INR - ( 07 Jul 2024 08:05 )   PT: 13.9 sec;   INR: 1.23          PTT - ( 07 Jul 2024 08:05 )  PTT:26.6 sec            
Febrile to 101.   Hemodynamically stable     Subjective: Patient seen and examined at bedside.    ICU Vital Signs Last 24 Hrs  T(C): 36.9 (08 Jul 2024 06:00), Max: 38.3 (07 Jul 2024 23:01)  T(F): 98.5 (08 Jul 2024 06:00), Max: 101 (07 Jul 2024 23:01)  HR: 90 (08 Jul 2024 08:00) (55 - 103)  BP: 106/66 (08 Jul 2024 07:00) (77/51 - 139/63)  BP(mean): 78 (08 Jul 2024 07:00) (57 - 91)  ABP: --  ABP(mean): --  RR: 17 (08 Jul 2024 07:00) (14 - 21)  SpO2: 100% (08 Jul 2024 08:00) (94% - 100%)    O2 Parameters below as of 08 Jul 2024 08:00  Patient On (Oxygen Delivery Method): room air          I&O's Summary    07 Jul 2024 07:01  -  08 Jul 2024 07:00  --------------------------------------------------------  IN: 1840.4 mL / OUT: 3040 mL / NET: -1199.6 mL          PHYSICAL EXAM:  GENERAL: Disheveled 40 year old Female, ungroomed hair.  HEAD:  Atraumatic, Normocephalic  EYES: + Normal pupils EOMI, PERRLA, conjunctiva and sclera clear  ENMT: No tonsillar erythema, exudates, or enlargement; Moist mucous membranes  NECK: Supple, No JVD, Normal thyroid  HEART: Regular rate and rhythm; No murmurs, rubs, or gallops  RESPIRATORY: CTA B/L, No W/R/R  ABDOMEN: Soft, Nontender, Nondistended; Bowel sounds present  NEUROLOGY: A&Ox3 (year, location, president), nonfocal, moving all extremities  EXTREMITIES:  1+ pitting edema 2+ Peripheral Pulses, No clubbing, cyanosis  SKIN: Warm & dry, soiled dressings. Chronic appearing ulcers BL on LE.     LABS:                        8.3    10.70 )-----------( 221      ( 08 Jul 2024 06:31 )             26.0     07-08    138  |  106  |  7   ----------------------------<  205<H>  3.6   |  23  |  0.94    Ca    8.7      08 Jul 2024 06:31  Phos  3.0     07-08  Mg     1.9     07-08    TPro  6.8  /  Alb  2.9<L>  /  TBili  0.4  /  DBili  x   /  AST  87<H>  /  ALT  110<H>  /  AlkPhos  93  07-08    PT/INR - ( 07 Jul 2024 08:05 )   PT: 13.9 sec;   INR: 1.23          PTT - ( 07 Jul 2024 08:05 )  PTT:26.6 sec  Urinalysis Basic - ( 08 Jul 2024 06:31 )    Color: x / Appearance: x / SG: x / pH: x  Gluc: 205 mg/dL / Ketone: x  / Bili: x / Urobili: x   Blood: x / Protein: x / Nitrite: x   Leuk Esterase: x / RBC: x / WBC x   Sq Epi: x / Non Sq Epi: x / Bacteria: x      CAPILLARY BLOOD GLUCOSE      POCT Blood Glucose.: 221 mg/dL (08 Jul 2024 07:44)  POCT Blood Glucose.: 158 mg/dL (07 Jul 2024 21:19)  POCT Blood Glucose.: 207 mg/dL (07 Jul 2024 16:35)  POCT Blood Glucose.: 217 mg/dL (07 Jul 2024 12:35)    ABG - ( 07 Jul 2024 08:08 )  pH, Arterial: 7.36  pH, Blood: x     /  pCO2: 36    /  pO2: 91    / HCO3: 20    / Base Excess: -4.5  /  SaO2: 98.6                Consultant(s) Notes Reviewed:  [x ] YES  [ ] NO    MEDICATIONS  (STANDING):  chlorhexidine 2% Cloths 1 Application(s) Topical <User Schedule>  clindamycin IVPB 900 milliGRAM(s) IV Intermittent every 8 hours  dextrose 10% Bolus 125 milliLiter(s) IV Bolus once  dextrose 5%. 1000 milliLiter(s) (100 mL/Hr) IV Continuous <Continuous>  dextrose 5%. 1000 milliLiter(s) (50 mL/Hr) IV Continuous <Continuous>  dextrose 50% Injectable 25 Gram(s) IV Push once  dextrose 50% Injectable 12.5 Gram(s) IV Push once  glucagon  Injectable 1 milliGRAM(s) IntraMuscular once  heparin   Injectable 7500 Unit(s) SubCutaneous every 8 hours  insulin lispro (ADMELOG) corrective regimen sliding scale   SubCutaneous Before meals and at bedtime  norepinephrine Infusion 0.02 MICROgram(s)/kG/Min (4.08 mL/Hr) IV Continuous <Continuous>  piperacillin/tazobactam IVPB.. 4.5 Gram(s) IV Intermittent every 8 hours  vancomycin  IVPB 1750 milliGRAM(s) IV Intermittent every 12 hours    MEDICATIONS  (PRN):  acetaminophen     Tablet .. 650 milliGRAM(s) Oral every 6 hours PRN Temp greater or equal to 38C (100.4F)  dextrose Oral Gel 15 Gram(s) Oral once PRN Blood Glucose LESS THAN 70 milliGRAM(s)/deciliter      Care Discussed with Consultants/Other Providers [ x] YES  [ ] NO    RADIOLOGY & ADDITIONAL TESTS:

## 2024-07-08 NOTE — DIETITIAN INITIAL EVALUATION ADULT - PERTINENT LABORATORY DATA
07-08    138  |  106  |  7   ----------------------------<  205<H>  3.6   |  23  |  0.94    Ca    8.7      08 Jul 2024 06:31  Phos  3.0     07-08  Mg     1.9     07-08    TPro  6.8  /  Alb  2.9<L>  /  TBili  0.4  /  DBili  x   /  AST  87<H>  /  ALT  110<H>  /  AlkPhos  93  07-08  POCT Blood Glucose.: 221 mg/dL (07-08-24 @ 07:44)  A1C with Estimated Average Glucose Result: 10.2 % (07-07-24 @ 08:05)

## 2024-07-08 NOTE — BH CONSULTATION LIAISON ASSESSMENT NOTE - SUMMARY
43F with PMHx of non-insulin depedent diabetes mellitis, PE, TBI in the past with seizure d/o, sickle cell disease with anemia, HLD, not on any meds x few months, undomiciled, poor historian, admitted to the MICU for treatment for septic shock from cellulitis/bilateral LE wounds. She no longer requires pressor suport and is ready for step down to the floors, and is currently being treated w/ Vanc, Zosyn, and Clindamycin. Psychiatry was consulted due to patient's agitation, aggression, and psychotic behaviors during today's rounds in the MICU. As per patient, she has a history of TBI, depression, and mood disorder. Patient says that she has difficulty recalling information due to her TBI, and began to become agitated when further questioned about the TBI. She denies current SI, and says that she has not participated in self harm/SA since she was 14. She may have a family history of schizophrenia, but she denies ever having hallucinations or being diagnosed w/ schizophrenia. She has been hospitalized for psychiatric illness in the past, but says that she feels stable at the moment. Patient says she would like to see a psychiatrist and a therapist. She has been on psychiatric medications in the past, but is currently not on any medications.

## 2024-07-08 NOTE — BH CONSULTATION LIAISON ASSESSMENT NOTE - ADDITIONAL DETAILS ALL
Patient says that she cut her forearm when she was around 14 years old. She says that this was not a suicide attempt. She denies any other acts of self harm, and denies current SI.

## 2024-07-08 NOTE — BH CONSULTATION LIAISON ASSESSMENT NOTE - NSBHCHARTREVIEWVS_PSY_A_CORE FT
Vital Signs Last 24 Hrs  T(C): 36.7 (08 Jul 2024 09:11), Max: 38.3 (07 Jul 2024 23:01)  T(F): 98 (08 Jul 2024 09:11), Max: 101 (07 Jul 2024 23:01)  HR: 83 (08 Jul 2024 10:00) (64 - 103)  BP: 131/76 (08 Jul 2024 14:02) (77/51 - 131/76)  BP(mean): 98 (08 Jul 2024 14:02) (57 - 98)  RR: 18 (08 Jul 2024 09:00) (14 - 20)  SpO2: 100% (08 Jul 2024 10:00) (97% - 100%)    Parameters below as of 08 Jul 2024 09:00  Patient On (Oxygen Delivery Method): room air

## 2024-07-08 NOTE — BH CONSULTATION LIAISON ASSESSMENT NOTE - DESCRIPTION
Patient is not a good historian. She says she has been smoking cigarettes since age 13, but denies drug or alcohol use

## 2024-07-08 NOTE — BH CONSULTATION LIAISON ASSESSMENT NOTE - OTHER
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 Towards the end, she began to yell Patient was cooperative for the majority of the interview, but as time went on, she began getting hostile and frustrated, raising her voice and asking us to leave the room

## 2024-07-12 LAB
CULTURE RESULTS: SIGNIFICANT CHANGE UP
CULTURE RESULTS: SIGNIFICANT CHANGE UP
SPECIMEN SOURCE: SIGNIFICANT CHANGE UP
SPECIMEN SOURCE: SIGNIFICANT CHANGE UP

## 2024-07-15 DIAGNOSIS — G40.909 EPILEPSY, UNSPECIFIED, NOT INTRACTABLE, WITHOUT STATUS EPILEPTICUS: ICD-10-CM

## 2024-07-15 DIAGNOSIS — F17.210 NICOTINE DEPENDENCE, CIGARETTES, UNCOMPLICATED: ICD-10-CM

## 2024-07-15 DIAGNOSIS — E11.9 TYPE 2 DIABETES MELLITUS WITHOUT COMPLICATIONS: ICD-10-CM

## 2024-07-15 DIAGNOSIS — G93.41 METABOLIC ENCEPHALOPATHY: ICD-10-CM

## 2024-07-15 DIAGNOSIS — I95.9 HYPOTENSION, UNSPECIFIED: ICD-10-CM

## 2024-07-15 DIAGNOSIS — Z87.820 PERSONAL HISTORY OF TRAUMATIC BRAIN INJURY: ICD-10-CM

## 2024-07-15 DIAGNOSIS — L03.115 CELLULITIS OF RIGHT LOWER LIMB: ICD-10-CM

## 2024-07-15 DIAGNOSIS — F20.9 SCHIZOPHRENIA, UNSPECIFIED: ICD-10-CM

## 2024-07-15 DIAGNOSIS — A41.9 SEPSIS, UNSPECIFIED ORGANISM: ICD-10-CM

## 2024-07-15 DIAGNOSIS — D57.1 SICKLE-CELL DISEASE WITHOUT CRISIS: ICD-10-CM

## 2024-07-15 DIAGNOSIS — L03.116 CELLULITIS OF LEFT LOWER LIMB: ICD-10-CM

## 2024-07-15 DIAGNOSIS — M79.671 PAIN IN RIGHT FOOT: ICD-10-CM

## 2024-07-15 DIAGNOSIS — E78.5 HYPERLIPIDEMIA, UNSPECIFIED: ICD-10-CM

## 2024-07-15 DIAGNOSIS — Z53.29 PROCEDURE AND TREATMENT NOT CARRIED OUT BECAUSE OF PATIENT'S DECISION FOR OTHER REASONS: ICD-10-CM

## 2024-07-15 DIAGNOSIS — M79.672 PAIN IN LEFT FOOT: ICD-10-CM

## 2024-07-15 DIAGNOSIS — L97.911 NON-PRESSURE CHRONIC ULCER OF UNSPECIFIED PART OF RIGHT LOWER LEG LIMITED TO BREAKDOWN OF SKIN: ICD-10-CM

## 2024-07-15 DIAGNOSIS — R65.21 SEVERE SEPSIS WITH SEPTIC SHOCK: ICD-10-CM

## 2024-07-15 DIAGNOSIS — Z86.711 PERSONAL HISTORY OF PULMONARY EMBOLISM: ICD-10-CM

## 2024-07-15 DIAGNOSIS — L97.921 NON-PRESSURE CHRONIC ULCER OF UNSPECIFIED PART OF LEFT LOWER LEG LIMITED TO BREAKDOWN OF SKIN: ICD-10-CM
